# Patient Record
Sex: MALE | Race: OTHER
[De-identification: names, ages, dates, MRNs, and addresses within clinical notes are randomized per-mention and may not be internally consistent; named-entity substitution may affect disease eponyms.]

---

## 2019-04-09 ENCOUNTER — HOSPITAL ENCOUNTER (OUTPATIENT)
Dept: HOSPITAL 12 - LAB | Age: 54
Discharge: HOME | End: 2019-04-09
Attending: INTERNAL MEDICINE
Payer: COMMERCIAL

## 2019-04-09 DIAGNOSIS — Z01.810: Primary | ICD-10-CM

## 2019-04-09 DIAGNOSIS — M46.04: ICD-10-CM
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
## 2019-04-09 DIAGNOSIS — S92.911A: ICD-10-CM

## 2019-04-09 DIAGNOSIS — I45.10: ICD-10-CM
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
## 2019-04-09 DIAGNOSIS — R00.0: ICD-10-CM

## 2019-04-09 DIAGNOSIS — J84.10: ICD-10-CM
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
## 2019-04-09 LAB
ALP SERPL-CCNC: 83 U/L (ref 50–136)
ALT SERPL W/O P-5'-P-CCNC: 33 U/L (ref 16–63)
APPEARANCE UR: CLEAR
AST SERPL-CCNC: 19 U/L (ref 15–37)
BASOPHILS # BLD AUTO: 0.1 K/UL (ref 0–8)
BASOPHILS NFR BLD AUTO: 0.9 % (ref 0–2)
BILIRUB SERPL-MCNC: 0.9 MG/DL (ref 0.2–1)
BILIRUB UR QL STRIP: NEGATIVE
BUN SERPL-MCNC: 14 MG/DL (ref 7–18)
CHLORIDE SERPL-SCNC: 101 MMOL/L (ref 98–107)
CO2 SERPL-SCNC: 24 MMOL/L (ref 21–32)
COLOR UR: YELLOW
CREAT SERPL-MCNC: 1.1 MG/DL (ref 0.6–1.3)
DEPRECATED SQUAMOUS URNS QL MICRO: (no result) /HPF
EOSINOPHIL # BLD AUTO: 0.3 K/UL (ref 0–0.7)
EOSINOPHIL NFR BLD AUTO: 2.8 % (ref 0–7)
GLUCOSE SERPL-MCNC: 140 MG/DL (ref 74–106)
GLUCOSE UR STRIP-MCNC: NEGATIVE MG/DL
HCT VFR BLD AUTO: 44.4 % (ref 36.7–47.1)
HGB BLD-MCNC: 15.5 G/DL (ref 12.5–16.3)
HGB UR QL STRIP: (no result)
KETONES UR STRIP-MCNC: NEGATIVE MG/DL
LEUKOCYTE ESTERASE UR QL STRIP: NEGATIVE
LYMPHOCYTES # BLD AUTO: 1.9 K/UL (ref 20–40)
LYMPHOCYTES NFR BLD AUTO: 19 % (ref 20.5–51.5)
MCH RBC QN AUTO: 28.5 UUG (ref 23.8–33.4)
MCHC RBC AUTO-ENTMCNC: 35 G/DL (ref 32.5–36.3)
MCV RBC AUTO: 81.7 FL (ref 73–96.2)
MONOCYTES # BLD AUTO: 0.8 K/UL (ref 2–10)
MONOCYTES NFR BLD AUTO: 8.3 % (ref 0–11)
NEUTROPHILS # BLD AUTO: 6.8 K/UL (ref 1.8–8.9)
NEUTROPHILS NFR BLD AUTO: 69 % (ref 38.5–71.5)
NITRITE UR QL STRIP: NEGATIVE
PH UR STRIP: 6 [PH] (ref 5–8)
PLATELET # BLD AUTO: 271 K/UL (ref 152–348)
POTASSIUM SERPL-SCNC: 3.2 MMOL/L (ref 3.5–5.1)
RBC # BLD AUTO: 5.43 MIL/UL (ref 4.06–5.63)
RBC #/AREA URNS HPF: (no result) /HPF (ref 0–3)
SP GR UR STRIP: 1.01 (ref 1–1.03)
UROBILINOGEN UR STRIP-MCNC: 0.2 E.U./DL
WBC # BLD AUTO: 9.9 K/UL (ref 3.6–10.2)
WBC #/AREA URNS HPF: (no result) /HPF
WBC #/AREA URNS HPF: (no result) /HPF (ref 0–3)
WS STN SPEC: 7.3 G/DL (ref 6.4–8.2)

## 2019-04-09 PROCEDURE — A4663 DIALYSIS BLOOD PRESSURE CUFF: HCPCS

## 2019-04-12 ENCOUNTER — HOSPITAL ENCOUNTER (OUTPATIENT)
Dept: HOSPITAL 12 - DS | Age: 54
Discharge: HOME | End: 2019-04-12
Attending: ORTHOPAEDIC SURGERY
Payer: COMMERCIAL

## 2019-04-12 DIAGNOSIS — F32.9: ICD-10-CM

## 2019-04-12 DIAGNOSIS — Z87.81: ICD-10-CM

## 2019-04-12 DIAGNOSIS — F41.9: ICD-10-CM

## 2019-04-12 DIAGNOSIS — I45.10: ICD-10-CM

## 2019-04-12 DIAGNOSIS — E66.9: ICD-10-CM

## 2019-04-12 DIAGNOSIS — J45.909: ICD-10-CM

## 2019-04-12 DIAGNOSIS — M20.61: Primary | ICD-10-CM

## 2019-04-12 DIAGNOSIS — Z98.890: ICD-10-CM

## 2019-04-12 DIAGNOSIS — Z79.899: ICD-10-CM

## 2019-04-12 PROCEDURE — 36415 COLL VENOUS BLD VENIPUNCTURE: CPT

## 2019-04-12 PROCEDURE — 28208 REPAIR OF FOOT TENDON: CPT

## 2019-04-12 PROCEDURE — 82962 GLUCOSE BLOOD TEST: CPT

## 2019-04-12 PROCEDURE — 84132 ASSAY OF SERUM POTASSIUM: CPT

## 2019-04-12 PROCEDURE — 28160 PARTIAL REMOVAL OF TOE: CPT

## 2019-04-12 PROCEDURE — C1713 ANCHOR/SCREW BN/BN,TIS/BN: HCPCS

## 2019-04-12 PROCEDURE — A4217 STERILE WATER/SALINE, 500 ML: HCPCS

## 2019-06-28 NOTE — NUR
*-* CASE MANAGEMENT NOTES *-*



: RAMONA BROWER (CONTACT FOR ADD'L DAY AND DISCHARGE PLANNING NEEDS).

P:[phone]

P:[phone]

## 2019-07-08 LAB
ADD MANUAL DIFF: NO
ALBUMIN SERPL-MCNC: 4.4 G/DL (ref 3.4–5)
ALBUMIN/GLOB SERPL: 1.8 {RATIO} (ref 1–2.7)
ALP SERPL-CCNC: 81 U/L (ref 46–116)
ALT SERPL-CCNC: 15 U/L (ref 12–78)
ANION GAP SERPL CALC-SCNC: 9 MMOL/L (ref 5–15)
APTT BLD: 25 SEC (ref 23–33)
AST SERPL-CCNC: 13 U/L (ref 15–37)
BASOPHILS NFR BLD AUTO: 1.2 % (ref 0–2)
BILIRUB SERPL-MCNC: 0.8 MG/DL (ref 0.2–1)
BUN SERPL-MCNC: 16 MG/DL (ref 7–18)
CALCIUM SERPL-MCNC: 9.5 MG/DL (ref 8.5–10.1)
CHLORIDE SERPL-SCNC: 105 MMOL/L (ref 98–107)
CO2 SERPL-SCNC: 28 MMOL/L (ref 21–32)
CREAT SERPL-MCNC: 1.5 MG/DL (ref 0.55–1.3)
EOSINOPHIL NFR BLD AUTO: 4 % (ref 0–3)
ERYTHROCYTE [DISTWIDTH] IN BLOOD BY AUTOMATED COUNT: 13.2 % (ref 11.6–14.8)
GLOBULIN SER-MCNC: 2.5 G/DL
HCT VFR BLD CALC: 42.1 % (ref 42–52)
HGB BLD-MCNC: 13.9 G/DL (ref 14.2–18)
INR PPP: 1 (ref 0.9–1.1)
LYMPHOCYTES NFR BLD AUTO: 28.2 % (ref 20–45)
MCV RBC AUTO: 90 FL (ref 80–99)
MONOCYTES NFR BLD AUTO: 7 % (ref 1–10)
NEUTROPHILS NFR BLD AUTO: 59.5 % (ref 45–75)
PLATELET # BLD: 274 K/UL (ref 150–450)
POTASSIUM SERPL-SCNC: 5 MMOL/L (ref 3.5–5.1)
RBC # BLD AUTO: 4.67 M/UL (ref 4.7–6.1)
SODIUM SERPL-SCNC: 142 MMOL/L (ref 136–145)
WBC # BLD AUTO: 7.3 K/UL (ref 4.8–10.8)

## 2019-07-15 ENCOUNTER — HOSPITAL ENCOUNTER (INPATIENT)
Dept: HOSPITAL 72 - SDSOVERFLO | Age: 54
LOS: 10 days | Discharge: NURSING, REHAB OR LONG-TERM CARE | DRG: 470 | End: 2019-07-25
Payer: COMMERCIAL

## 2019-07-15 VITALS — SYSTOLIC BLOOD PRESSURE: 131 MMHG | DIASTOLIC BLOOD PRESSURE: 94 MMHG

## 2019-07-15 VITALS — SYSTOLIC BLOOD PRESSURE: 132 MMHG | DIASTOLIC BLOOD PRESSURE: 89 MMHG

## 2019-07-15 VITALS — DIASTOLIC BLOOD PRESSURE: 87 MMHG | SYSTOLIC BLOOD PRESSURE: 121 MMHG

## 2019-07-15 VITALS — DIASTOLIC BLOOD PRESSURE: 88 MMHG | SYSTOLIC BLOOD PRESSURE: 120 MMHG

## 2019-07-15 VITALS — DIASTOLIC BLOOD PRESSURE: 80 MMHG | SYSTOLIC BLOOD PRESSURE: 127 MMHG

## 2019-07-15 VITALS — DIASTOLIC BLOOD PRESSURE: 84 MMHG | SYSTOLIC BLOOD PRESSURE: 123 MMHG

## 2019-07-15 VITALS — SYSTOLIC BLOOD PRESSURE: 131 MMHG | DIASTOLIC BLOOD PRESSURE: 85 MMHG

## 2019-07-15 VITALS — SYSTOLIC BLOOD PRESSURE: 120 MMHG | DIASTOLIC BLOOD PRESSURE: 83 MMHG

## 2019-07-15 VITALS — SYSTOLIC BLOOD PRESSURE: 117 MMHG | DIASTOLIC BLOOD PRESSURE: 72 MMHG

## 2019-07-15 VITALS — DIASTOLIC BLOOD PRESSURE: 88 MMHG | SYSTOLIC BLOOD PRESSURE: 130 MMHG

## 2019-07-15 VITALS — SYSTOLIC BLOOD PRESSURE: 122 MMHG | DIASTOLIC BLOOD PRESSURE: 85 MMHG

## 2019-07-15 VITALS — HEIGHT: 65 IN | BODY MASS INDEX: 35.49 KG/M2 | WEIGHT: 213 LBS

## 2019-07-15 VITALS — SYSTOLIC BLOOD PRESSURE: 119 MMHG | DIASTOLIC BLOOD PRESSURE: 86 MMHG

## 2019-07-15 VITALS — DIASTOLIC BLOOD PRESSURE: 91 MMHG | SYSTOLIC BLOOD PRESSURE: 121 MMHG

## 2019-07-15 VITALS — SYSTOLIC BLOOD PRESSURE: 130 MMHG | DIASTOLIC BLOOD PRESSURE: 93 MMHG

## 2019-07-15 VITALS — DIASTOLIC BLOOD PRESSURE: 84 MMHG | SYSTOLIC BLOOD PRESSURE: 132 MMHG

## 2019-07-15 VITALS — DIASTOLIC BLOOD PRESSURE: 79 MMHG | SYSTOLIC BLOOD PRESSURE: 115 MMHG

## 2019-07-15 VITALS — SYSTOLIC BLOOD PRESSURE: 127 MMHG | DIASTOLIC BLOOD PRESSURE: 88 MMHG

## 2019-07-15 VITALS — SYSTOLIC BLOOD PRESSURE: 132 MMHG | DIASTOLIC BLOOD PRESSURE: 86 MMHG

## 2019-07-15 DIAGNOSIS — N28.9: ICD-10-CM

## 2019-07-15 DIAGNOSIS — I10: ICD-10-CM

## 2019-07-15 DIAGNOSIS — M12.561: Primary | ICD-10-CM

## 2019-07-15 DIAGNOSIS — D64.9: ICD-10-CM

## 2019-07-15 PROCEDURE — 85610 PROTHROMBIN TIME: CPT

## 2019-07-15 PROCEDURE — 80048 BASIC METABOLIC PNL TOTAL CA: CPT

## 2019-07-15 PROCEDURE — 86901 BLOOD TYPING SEROLOGIC RH(D): CPT

## 2019-07-15 PROCEDURE — 86709 HEPATITIS A IGM ANTIBODY: CPT

## 2019-07-15 PROCEDURE — 80053 COMPREHEN METABOLIC PANEL: CPT

## 2019-07-15 PROCEDURE — 86803 HEPATITIS C AB TEST: CPT

## 2019-07-15 PROCEDURE — 0SRC0J9 REPLACEMENT OF RIGHT KNEE JOINT WITH SYNTHETIC SUBSTITUTE, CEMENTED, OPEN APPROACH: ICD-10-PCS

## 2019-07-15 PROCEDURE — 86705 HEP B CORE ANTIBODY IGM: CPT

## 2019-07-15 PROCEDURE — 87340 HEPATITIS B SURFACE AG IA: CPT

## 2019-07-15 PROCEDURE — 86850 RBC ANTIBODY SCREEN: CPT

## 2019-07-15 PROCEDURE — 86900 BLOOD TYPING SEROLOGIC ABO: CPT

## 2019-07-15 PROCEDURE — 82728 ASSAY OF FERRITIN: CPT

## 2019-07-15 PROCEDURE — 94003 VENT MGMT INPAT SUBQ DAY: CPT

## 2019-07-15 PROCEDURE — 36415 COLL VENOUS BLD VENIPUNCTURE: CPT

## 2019-07-15 PROCEDURE — 84443 ASSAY THYROID STIM HORMONE: CPT

## 2019-07-15 PROCEDURE — 85730 THROMBOPLASTIN TIME PARTIAL: CPT

## 2019-07-15 PROCEDURE — 83550 IRON BINDING TEST: CPT

## 2019-07-15 PROCEDURE — 87081 CULTURE SCREEN ONLY: CPT

## 2019-07-15 PROCEDURE — 94150 VITAL CAPACITY TEST: CPT

## 2019-07-15 PROCEDURE — 83540 ASSAY OF IRON: CPT

## 2019-07-15 PROCEDURE — 85025 COMPLETE CBC W/AUTO DIFF WBC: CPT

## 2019-07-15 RX ADMIN — SODIUM CHLORIDE SCH MLS/HR: 0.9 INJECTION INTRAVENOUS at 23:46

## 2019-07-15 RX ADMIN — MORPHINE SULFATE PRN MLS/HR: 1 INJECTION, SOLUTION INTRAVENOUS at 19:02

## 2019-07-15 RX ADMIN — DOCUSATE SODIUM SCH MG: 100 CAPSULE, LIQUID FILLED ORAL at 18:01

## 2019-07-15 RX ADMIN — ACETAMINOPHEN SCH MG: 500 TABLET, FILM COATED ORAL at 18:01

## 2019-07-15 RX ADMIN — DEXTROSE, SODIUM CHLORIDE, AND POTASSIUM CHLORIDE SCH MLS/HR: 5; .45; .15 INJECTION INTRAVENOUS at 15:21

## 2019-07-15 RX ADMIN — MORPHINE SULFATE PRN MLS/HR: 1 INJECTION, SOLUTION INTRAVENOUS at 12:08

## 2019-07-15 RX ADMIN — DOCUSATE SODIUM SCH MG: 100 CAPSULE, LIQUID FILLED ORAL at 13:00

## 2019-07-15 RX ADMIN — OXYCODONE HYDROCHLORIDE SCH MG: 20 TABLET, FILM COATED, EXTENDED RELEASE ORAL at 20:30

## 2019-07-15 RX ADMIN — ACETAMINOPHEN SCH MG: 500 TABLET, FILM COATED ORAL at 13:00

## 2019-07-15 RX ADMIN — SODIUM CHLORIDE SCH MLS/HR: 0.9 INJECTION INTRAVENOUS at 15:21

## 2019-07-15 NOTE — NUR
NURSE NOTES:



Pt arrived the unit. Patient a/o x 4, in bed. Denies pain, no respiratory distress noted. 
Left wrist IV is patent. Right knee surgical site dressing is C/D/I. Ice pack applied. 
Hemovac is in placed. Unit orientation was given. Bed in lowest position and locked, call 
light within reach. Will continue to monitor.

## 2019-07-15 NOTE — OPERATIVE NOTE - DICTATED
DATE OF OPERATION:  07/15/2019

SURGEON:  Phil Choi M.D.



ANESTHESIOLOGIST:  Kashif Burroughs M.D.



ANESTHESIA:  General and spinal.



ASSISTANT:  DAREN Langford



PREOPERATIVE DIAGNOSIS:  Tricompartmental traumatic arthritis right knee

with significant varus weightbearing deformity and medial compartment

joint space narrowing.



POSTOPERATIVE DIAGNOSIS:  Tricompartmental traumatic arthritis right knee

with significant varus weightbearing deformity and medial compartment

joint space narrowing.



OPERATIVE PROCEDURE:  Right knee resurfacing arthroplasty using a Billy

Natural Knee with a 0 cemented 7 mm all-polyethylene patella, a size 2

femoral component and a size 1 tibial component with a 9 mm congruent

polyethylene and two 35 mm APR screws.  The femoral and tibial components

were both ingrowth.  The procedure involved the lateral patellar

retinacular release and realignment by ligament rebalancing.  The patient

was prepped and draped supine on the operating table.  After induction of

satisfactory spinal and general anesthesia, the right leg was positioned

with a bolster behind the right hip to neutralize rotation of the knee.  A

bump was placed on the table to facilitate maintenance of the knee in

flexion during procedure and a hip bump was used to prevent external rotation

of the hip also during the procedure.  The leg was examined with 10

degrees lacking in full extension and further flexion to 100 degrees.

There was no ligamentous instability.  No drawer and no Lachman's.  No

rotational instability.  The patient was stable to varus and valgus

stress.  There was notable patellofemoral crepitus with moderate lateral

patellar tracking.  The leg was prepped and draped freely and then

elevated and exsanguinated with an Esmarch bandage.  A medial parapatellar

incision was made along the distal quads expanse going along the medial

border of the patella and inferior patella ligament to the tibial

tuberosity.  Incision was carried down through skin and subcutaneous

tissue.  The extensor apparatus was exposed.  A medial capsular incision

was made down to the tuberosity and through the distal quads tendon.

Lateral retinacular release was accomplished so that the patella could be

turned to 180 degrees exposing its articular surface.  Remnants of the fat

pad was removed and the anterior horn of both the medial and lateral

meniscus was removed.  The superficial medial collateral and posterior

medial capsular ligaments were released to allow bouncing of the knee from

its varus deformity.  Also, the semimembranosus posteriorly was released

from the tibia to facilitate extension.  The patella was exposed.  All the

peripheral articular surfaces were defined by removing adherent

surrounding synovium.  The patella was sized and the minimal articular cut

was made to leave most of the substance of the patella intact while

creating a flat surface for implant of the polyethylene patella.  A

cutting jig was used and then 3 anchoring holes were made for a 0 all-poly

patella.  It was reduced and then checked for range of motion.  The

patella button was cemented in place with all excess cement removed after

implants at the femoral and tibial components.  The knee was then flexed

and a bent knee retractor was used to pull the tibia floor to remove the

remnants of the posterior horn of the medial lateral meniscus and to

recess the PCL over the tibia on its lateral side.  However, the main

attachment of the PCL was left intact.  The femoral medullary canal was

then identified with a jig and an intramedullary anders was inserted.  A

cutting jig was then placed and weightbearing alignment of the hip and

rotation were checked.  The cut that was templated with the jig in place

corresponded to that, which had been measured on the weightbearing x-rays

prior to surgery.  The distal cuff involved a little more medial than

lateral.  The anterior-posterior chamfer and notch cuts were then made.

The trial femoral reduction revealed good contact on all cut surfaces with

good stability and good alignment.  Tibia was then subluxed forward and

the tibial alignment jig was positioned.  It was adjusted for

weightbearing alignment and for slope as well as for varus and valgus.  A

measurement corresponded to the weightbearing x-ray was then chosen and

the tibial cutting block was fixed over 2 drill holes and temporary pins.

The proximal tibial cut was made with the appropriate slope and alignment

taking a little more proximal tibia on the lateral side than medial.  Once

the cut surface was exposed, the trial tibia was placed and anchoring

holes were made for the peripheral support and for the 2 central anchoring

holes.  The central punch was then used to create the cruciate support and

a trial tibial component was then inserted along with a 9 mm polyethylene.

This was templated with the femur in place and provided good stability,

good range of motion, full extension, flexion to 110, good stability

medial and lateral, and good patellar tracking.  The tibial component was

then tapped into place and 2 APR screws were used to secure it.

Polyethylene was then inserted with no gap and the femoral component was

also tapped into place.  Again range of motion and stability were checked.

The tourniquet was then released.  Bleeders were coagulated.  The medial

capsule was closed with sutures of #1 Vicryl to the subcutaneous skin

separately.  The patient was placed in a bulky compression dressing and a

medium Hemovac drain was left in the site of the lateral release, however,

no suction was initially placed .  The patient was returned to recovery

room in good condition.









  ______________________________________________

  Phil Choi M.D.





DR:  JOVANNI

D:  07/15/2019 10:27

T:  07/15/2019 16:05

JOB#:  [id number]

CC:



YAZAN

## 2019-07-15 NOTE — ANETHESIA PREOPERATIVE EVAL
Anesthesia Pre-op PMH/ROS


General


Date of Evaluation:  Jul 15, 2019


Time of Evaluation:  07:20


Anesthesiologist:  Manjeet


ASA Score:  ASA 2


Mallampati Score


Class I : Soft palate, uvula, fauces, pillars visible


Class II: Soft palate, uvula, fauces visible


Class III: Soft palate, base of uvula visible


Class IV: Only hard plate visible


Mallampati Classification:  Class II


Surgeon:  Jimbo


Diagnosis:  R knee DJD


Surgical Procedure:  R total knee arthroplasty


Anesthesia History:  none


Family History:  no anesthesia problems


Allergies:  


Coded Allergies:  


     No Known Allergies (Unverified , 7/12/19)


Medications:  see eMAR


Patient NPO?:  Yes


NPO Date:  Jul 14, 2019


NPO Time:  [id number]





Past Medical History


Cardiovascular:  Reports: HTN - stable on pills


Pulmonary:  Denies: asthma, COPD, KASH, other


Gastrointestinal/Genitourinary:  Reports: GERD, CRI - high Cr follow up 

recomended; 


   Denies: ESRD, other


Neurologic/Psychiatric:  Reports: depression/anxiety, other - chronic pain; 


   Denies: dementia, CVA, TIA


Endocrine:  Denies: DM, hypothyroidism, steroids, other


HEENT:  Denies: cataract (L), cataract (R), glaucoma, [place] (L), [place] (R), other


Hematology/Immune:  Denies: anemia, DVT, bleeding disorder, other


Musculoskeletal/Integumentary:  Reports: DJD; 


   Denies: OA, RA, DDD, edema, other


Other:  other - overweight


PMH Narrative:


as above


PSxH Narrative:


orchidectomy for testicular torsion, hernia repair, knee scope lumbar spine Sx.





Anesthesia Pre-op Phys. Exam


Physician Exam





Last Vital Signs








  Date Time  Temp Pulse Resp B/P (MAP) Pulse Ox O2 Delivery O2 Flow Rate FiO2


 


7/15/19 07:00 98.1 108 20 115/79 (91) 100   


 


7/15/19 06:58      Room Air  








Constitutional:  NAD


Neurologic:  CN 2-12 intact


Cardiovascular:  RRR, no M/R/G


Respiratory:  CTA


Gastrointestinal:  S/NT/ND





Airway Exam


Mallampati Score:  Class II


MO:  limited


Neck:  short


ROM:  limited


Teeth:  missing


Dentures:  no upper, no lower





Anesthesia Pre-op A/P


Labs


see chart





Studies


Pre-op Studies:  EKG - NSR, CXR - WNL, echo - EF 55-60%





Risk Assessment & Plan


Assessment:


ASA 2


Plan:


SAB vs GA


Status Change Before Surgery:  No





Pre-Antibiotics


Drug:  Ancef 2gr.


Given Within 1 Hr of Incision:  Yes


Time Given:  08:20











Khris Burroughs MD Jul 15, 2019 08:45

## 2019-07-15 NOTE — NUR
CASE MANAGEMENT: INITIAL REVIEW 



52 YO M PRESENTED TO HOSPITAL FOR SURGERY 



PMHx: HTN. ASTHMA. HERNIA. 



SI:PAIN RIGHT KNEE

T 98.1  RR 20 B/P 115/79 SATS 100% ON RA 

CR 1.5  AST 13 



IS: OR MEDS 



**PATIENT ADMITTED TO MED/SURG 7/15/2019 @ 1046****



PLAN OF CARE: 

Operative Note

Pre-op Diagnosis:

Degenerative Joint Disease, Right Knee

Procedure:

Right Total Knee Resurface Right

Post-op Diagnosis:  same as pre-op

## 2019-07-15 NOTE — NUR
NURSE NOTES:



Dr. Jarrett was notified regarding home meds. Dr. Jarrett wants to continue Norco 5-325 q4 
prn, Lisinopril 10mg daily, Omeprazole 40mg daily. Noted and carried out.

## 2019-07-15 NOTE — OPERATIVE NOTE - PDOC
Operative Note


Operative Note


Pre-op Diagnosis:


Degenerative Joint Disease, Right Knee


Procedure:


Right Total Knee Resurface Right


Post-op Diagnosis:  same as pre-op


Operative Findings:  consistent w/pre-op dx studies


Surgeon:  Jimbo


Assistant:  CLIFTON Ashton


Anesthesiologist:  Manjeet


Anesthesia:  general


Specimen:  yes


Complications:  none


Condition:  stable


Estimated Blood Loss:  minimal


Drains:  hemovac


Tourniquet time:  110 - min


Implant(s) used?:  Yes - Billy Natural Knee











Phil Ashton Jul 15, 2019 11:00

## 2019-07-15 NOTE — NUR
NURSE NOTES:

Pt is asleep but easily aroused, alert. Breathing is even and non labored. BS sound intact 4 
quadrants. Right knee pain persist but decrease after taking po medication a little bit. 
After administration of PRN Zofran 4mg IVP d/t nausea sense, pt feels better. Explain about 
S/E of PCA medication. Pt verbalizes understanding. On dominguez catheterization 16 fr. d/t op 
and patent with yellowish urine. Will continue to monitor.

## 2019-07-15 NOTE — IMMEDIATE POST-OP EVALUATION
Immediate Post-Op Evalulation


Immediate Post-Op Evalulation


Procedure:  R total knee arthroplasty


Date of Evaluation:  Jul 15, 2019


Time of Evaluation:  11:00


IV Fluids:  1500


Blood Products:  none


Estimated Blood Loss:  150


Urinary Output:  100


Blood Pressure Systolic:  113


Blood Pressure Diastolic:  81


Pulse Rate:  102


Respiratory Rate:  22


O2 Sat by Pulse Oximetry:  99


Temperature (Fahrenheit):  98.2


Pain Score (1-10):  2


Nausea:  No


Vomiting:  No


Complications


none


Patient Status:  reacts, patent, none


Hydration Status:  adequate











Khris Burroughs MD Jul 15, 2019 11:01

## 2019-07-15 NOTE — PRE-PROCEDURE NOTE/ATTESTATION
Pre-Procedure Note/Attestation


Complete Prior to Procedure


Planned Procedure:  right


Procedure Narrative:


For Right Total Knee Arthroplasty





Indications for Procedure


Pre-Operative Diagnosis:


Degenerative Joint Disease, Right Knee





Attestation


I attest that I discussed the nature of the procedure; its benefits; risks and 

complications; and alternatives (and the risks and benefits of such alternatives

), prior to the procedure, with the patient (or the patient's legal 

representative).





I attest that, if there was a reasonable possibility of needing a blood 

transfusion, the patient (or the patient's legal representative) was given the 

[facility] standardized written summary, pursuant 

to the [place] Blood Safety Act ([place] Health and Safety Code # 1645, as 

amended).





I attest that I re-evaluated the patient just prior to the surgery and that 

there has been no change in the patient's H&P, except as documented below:











Phil Ashton Jul 15, 2019 07:11

## 2019-07-15 NOTE — NUR
NURSE NOTES:

Receive a report from SONAL Hurtado. Done rounds. Pt is asleep but easily awake by calling his 
name. On PCA, Morphine, for pain control. Right knee op site is wrapped with ACE without 
bleeding signs. Leave call light within reach. Will continue to monitor.

## 2019-07-15 NOTE — DIAGNOSTIC IMAGING REPORT
Indications:  Postoperative

 

Technique: Two views of the knee

 

Comparison: None

 

Findings: Two postoperative views of the right knee demonstrate total knee

arthroplasty, good anatomic alignment of the prosthesis. There is a surgical drain in

place. . There is postsurgical soft tissue air. Overlying skin staples.

 

Impression: Postoperative right knee, no unusual features.

## 2019-07-16 VITALS — SYSTOLIC BLOOD PRESSURE: 111 MMHG | DIASTOLIC BLOOD PRESSURE: 73 MMHG

## 2019-07-16 VITALS — DIASTOLIC BLOOD PRESSURE: 73 MMHG | SYSTOLIC BLOOD PRESSURE: 112 MMHG

## 2019-07-16 VITALS — SYSTOLIC BLOOD PRESSURE: 95 MMHG | DIASTOLIC BLOOD PRESSURE: 60 MMHG

## 2019-07-16 VITALS — SYSTOLIC BLOOD PRESSURE: 87 MMHG | DIASTOLIC BLOOD PRESSURE: 55 MMHG

## 2019-07-16 VITALS — DIASTOLIC BLOOD PRESSURE: 73 MMHG | SYSTOLIC BLOOD PRESSURE: 116 MMHG

## 2019-07-16 VITALS — DIASTOLIC BLOOD PRESSURE: 52 MMHG | SYSTOLIC BLOOD PRESSURE: 89 MMHG

## 2019-07-16 LAB
ADD MANUAL DIFF: NO
BASOPHILS NFR BLD AUTO: 0.5 % (ref 0–2)
EOSINOPHIL NFR BLD AUTO: 2 % (ref 0–3)
ERYTHROCYTE [DISTWIDTH] IN BLOOD BY AUTOMATED COUNT: 13 % (ref 11.6–14.8)
HCT VFR BLD CALC: 29.6 % (ref 42–52)
HGB BLD-MCNC: 10 G/DL (ref 14.2–18)
LYMPHOCYTES NFR BLD AUTO: 12.6 % (ref 20–45)
MCV RBC AUTO: 91 FL (ref 80–99)
MONOCYTES NFR BLD AUTO: 11 % (ref 1–10)
NEUTROPHILS NFR BLD AUTO: 74 % (ref 45–75)
PLATELET # BLD: 173 K/UL (ref 150–450)
RBC # BLD AUTO: 3.25 M/UL (ref 4.7–6.1)
WBC # BLD AUTO: 11 K/UL (ref 4.8–10.8)

## 2019-07-16 RX ADMIN — MORPHINE SULFATE PRN MLS/HR: 1 INJECTION, SOLUTION INTRAVENOUS at 19:44

## 2019-07-16 RX ADMIN — ENOXAPARIN SODIUM SCH MG: 40 INJECTION SUBCUTANEOUS at 09:20

## 2019-07-16 RX ADMIN — SODIUM CHLORIDE SCH MLS/HR: 0.9 INJECTION INTRAVENOUS at 23:48

## 2019-07-16 RX ADMIN — ACETAMINOPHEN SCH MG: 500 TABLET, FILM COATED ORAL at 13:04

## 2019-07-16 RX ADMIN — SODIUM CHLORIDE SCH MLS/HR: 0.9 INJECTION INTRAVENOUS at 08:19

## 2019-07-16 RX ADMIN — ACETAMINOPHEN SCH MG: 500 TABLET, FILM COATED ORAL at 08:20

## 2019-07-16 RX ADMIN — LISINOPRIL SCH MG: 10 TABLET ORAL at 08:19

## 2019-07-16 RX ADMIN — OXYCODONE HYDROCHLORIDE SCH MG: 20 TABLET, FILM COATED, EXTENDED RELEASE ORAL at 08:19

## 2019-07-16 RX ADMIN — ACETAMINOPHEN SCH MG: 500 TABLET, FILM COATED ORAL at 18:10

## 2019-07-16 RX ADMIN — SODIUM CHLORIDE SCH MLS/HR: 0.9 INJECTION INTRAVENOUS at 16:27

## 2019-07-16 RX ADMIN — DOCUSATE SODIUM SCH MG: 100 CAPSULE, LIQUID FILLED ORAL at 13:03

## 2019-07-16 RX ADMIN — DEXTROSE, SODIUM CHLORIDE, AND POTASSIUM CHLORIDE SCH MLS/HR: 5; .45; .15 INJECTION INTRAVENOUS at 04:48

## 2019-07-16 RX ADMIN — OXYCODONE HYDROCHLORIDE SCH MG: 20 TABLET, FILM COATED, EXTENDED RELEASE ORAL at 20:58

## 2019-07-16 RX ADMIN — DOCUSATE SODIUM SCH MG: 100 CAPSULE, LIQUID FILLED ORAL at 08:20

## 2019-07-16 RX ADMIN — DOCUSATE SODIUM SCH MG: 100 CAPSULE, LIQUID FILLED ORAL at 18:11

## 2019-07-16 NOTE — NUR
NURSE NOTES:

pt is asleep but easily awake, alert. Spo2 checked as 92% in RA. Lung sound is clear but 
decreased on both lower lung. Encourage to deep breathing and apply O2 2L/min via NC. Spo2 
goes up to 95-96%. Keep Semi-Brasher's position. BP: 95.60mmHg, OR: 109 bmp. No HA or 
dizziness noted. Op site is clear without bleeding signs. Hemovac inserted state and 
serosanguineous drainage drained. Will continue to monitor.

## 2019-07-16 NOTE — NUR
*** INSURANCE ***



UPDATED CLINICALS and REVIEW HAVE BEEN FAXED 

PLEASE FAX THE REVIEW AND CLINICAL TO



CALLED : RAMONA BROWER

P:[phone]

P:[phone]

F: [phone]

## 2019-07-16 NOTE — GENERAL PROGRESS NOTE
Assessment/Plan


Assessment/Plan:


Right knee resurfacing arthroplasty


right knee arthritis


postop pain 





PLAN


1. incentive spirometry


2. Lovenox


3. PT evaluation and therapy


4. Hydration


5. Pain management


6. discharge once stable with outpatient follow up





Subjective


Allergies:  


Coded Allergies:  


     No Known Allergies (Unverified , 7/12/19)


Subjective


care noted


asked to follow up post op





Objective





Last 24 Hour Vital Signs








  Date Time  Temp Pulse Resp B/P (MAP) Pulse Ox O2 Delivery O2 Flow Rate FiO2


 


7/16/19 08:19    112/73    


 


7/16/19 04:00 97.8 103 19 116/73 (87) 97   


 


7/16/19 04:00   18     


 


7/16/19 00:00   18     


 


7/16/19 00:00 98.6 109 18 95/60 (72) 95   


 


7/15/19 21:00      Room Air  


 


7/15/19 20:00 97.6 93 18 127/88 (101) 96   


 


7/15/19 20:00   18     


 


7/15/19 16:35 98.2 79 20 117/72 (87) 97   


 


7/15/19 16:00   18     


 


7/15/19 15:35 97.1 93 19 130/93 (105) 97   


 


7/15/19 14:35 98.7 94 18 131/94 (106) 100   


 


7/15/19 13:35 97.2 95 21 132/89 (103) 96   


 


7/15/19 13:05 97.6 99 18 127/80 (96) 100   


 


7/15/19 13:00 98.6 95 19 131/85 100 Nasal Cannula 2.0 


 


7/15/19 12:58   18     


 


7/15/19 12:45  96 20 132/86 98 Nasal Cannula 2.0 


 


7/15/19 12:38 98.6       


 


7/15/19 12:30  98 19 123/84 98 Nasal Cannula 2.0 


 


7/15/19 12:15  99 20 122/85 97 Nasal Cannula 2.0 


 


7/15/19 12:00  97 19 119/86 97 Nasal Cannula 2.0 


 


7/15/19 11:45  98 20 121/87 98 Nasal Cannula 2.0 


 


7/15/19 11:30  99 18 132/84 100 Simple Mask 6.0 


 


7/15/19 11:15  100 19 121/91 100 Simple Mask 6.0 


 


7/15/19 11:06  102 20 130/88 100 Simple Mask 6.0 


 


7/15/19 11:01  103 20 120/88 100 Simple Mask 6.0 


 


7/15/19 11:01  102 22  99   


 


7/15/19 10:56 98.7 104 22 120/83 100 Simple Mask 6.0 


 


7/15/19 09:00 98.6       

















Intake and Output  


 


 7/15/19 7/16/19





 19:00 07:00


 


Intake Total 1638 ml 1075 ml


 


Output Total 690 ml 815 ml


 


Balance 948 ml 260 ml


 


  


 


Intake Oral 238 ml 200 ml


 


IV Total 1400 ml 875 ml


 


Output Urine Total 430 ml 625 ml


 


Drainage Total 260 ml 190 ml


 


# Voids  1








Laboratory Tests


7/16/19 05:20: 


White Blood Count 11.0H, Red Blood Count 3.25L, Hemoglobin 10.0L, Hematocrit 

29.6L, Mean Corpuscular Volume 91, Mean Corpuscular Hemoglobin 31.0, Mean 

Corpuscular Hemoglobin Concent 33.9, Red Cell Distribution Width 13.0, Platelet 

Count 173, Mean Platelet Volume 6.3L, Neutrophils (%) (Auto) 74.0, Lymphocytes (

%) (Auto) 12.6L, Monocytes (%) (Auto) 11.0H, Eosinophils (%) (Auto) 2.0, 

Basophils (%) (Auto) 0.5


Height (Feet):  5


Height (Inches):  5.00


Weight (Pounds):  192


Objective


WDWN


NAD


clear breath sounds bilaterally without rhonchi or wheeze


[id number] without MRG


NABS nontender no HSM


no CCE


nonfocal











Suraj Jarrett MD Jul 16, 2019 08:46

## 2019-07-16 NOTE — NUR
PT EVALUATION NOTE

Patient seen for initial evaluation, see complete evaluation for details. Patient presents 
with limited 

functional mobility and pain s/p R TKA. Patient will benefit from skilled inpatient PT 
intervention to address strength, ROM, safety and functional mobility with appropriate 
assistive device. Recommend discharge to SNF for further rehab vs home with home PT 
depending on patient's progress once 

medically cleared by MD. Patient lives in second floor apartment, has 12-14 stairs to 
negotiate to access apartment. Patient has FWW at home, may also benefit from raised toilet 
seat. 

-------------------------------------------------------------------------------

Addendum: 07/16/19 at 0955 by NIKOS HAN PT

-------------------------------------------------------------------------------

Amended: Links added.

## 2019-07-16 NOTE — NUR
NURSE NOTES:

Patient is in bed awake and able to verbalize needs. Stable. COmplains of pain, patient 
encouraged to use PCA as ordered. Patient is encouraged to use call light for assistance, 
verbalized understanding. Patient is in bed in locked and lowest position with call light 
within reach. Will continue to monitor.

## 2019-07-16 NOTE — 48 HOUR POST ANESTHESIA EVAL
Post Anesthesia Evaluation


Procedure:  R total knee arthroplasty


Date of Evaluation:  Jul 16, 2019


Time of Evaluation:  09:02


Blood Pressure Systolic:  125


0:  76


Pulse Rate:  72


Respiratory Rate:  22


Temperature (Fahrenheit):  97.7


O2 Sat by Pulse Oximetry:  98


Airway:  patent


Nausea:  No


Vomiting:  No


Pain Intensity:  3


Hydration Status:  adequate


Cardiopulmonary Status:


stable


Mental Status/LOC:  patient returned to baseline


Follow-up Care/Observations:


n/a


Post-Anesthesia Complications:


none


Follow-up care needed:  N/A











Khris Burroughs MD Jul 16, 2019 09:04

## 2019-07-16 NOTE — NUR
***DISCHARGE PLANNING

CALLED : RAMONA BROWER

P:[phone]

P:[phone]

REQUESTED ARU PLACEMENT

[facility]FA FORM FAXED TO [facility] FROM DR BOOGIE'S OFFICE

WILL PROBABLY TAKE A DAY OR TWO FOR APPROVAL



*WHEN [facility] GOES OUT OF TOWN PLEASE CONTACT [facility] T: [phone]

## 2019-07-16 NOTE — NUR
CASE MANAGEMENT:REVIEW



7/16/19

SI: POD #1

RT KNEE RESURFACING ARTHROPLASTY

99.8   126  18  112/73  98% ON RA

WBC+11.0   H/H-10.0/29.6   



IS: IV ANCEF Q8HRS

LISINOPRIL PO QD

IV ANCEF Q8HR

MORPHINE PCA

**: MED/SURG STATUS 3 EAST



PLAN:

REFER TO ACUTE REHAB FOR RECOVERY

## 2019-07-16 NOTE — NUR
NURSE NOTES:

Endorsed to night shift nurse that patient has not yet voided following dominguez removal.

## 2019-07-16 NOTE — NUR
NURSE NOTES:



Received patient awake,alert, verbal,resting in bed, wound dressing dry and intact with ace 
wrap on.

## 2019-07-16 NOTE — NUR
NURSE NOTES:

Nausea sensation gets better after prn Zofran 4mg IVP. Pain continue but intensity gets 
decreased than yesterday with PCA, Morphine. Op site is clear without bleeding sign. Empty 
Hemovac total 190ml, bloody color drainage. Spo2 checked 97 % with O2 2L/min NC. Will 
continue to monitor.

## 2019-07-17 VITALS — SYSTOLIC BLOOD PRESSURE: 99 MMHG | DIASTOLIC BLOOD PRESSURE: 55 MMHG

## 2019-07-17 VITALS — DIASTOLIC BLOOD PRESSURE: 48 MMHG | SYSTOLIC BLOOD PRESSURE: 77 MMHG

## 2019-07-17 VITALS — DIASTOLIC BLOOD PRESSURE: 69 MMHG | SYSTOLIC BLOOD PRESSURE: 104 MMHG

## 2019-07-17 VITALS — SYSTOLIC BLOOD PRESSURE: 89 MMHG | DIASTOLIC BLOOD PRESSURE: 48 MMHG

## 2019-07-17 VITALS — SYSTOLIC BLOOD PRESSURE: 96 MMHG | DIASTOLIC BLOOD PRESSURE: 61 MMHG

## 2019-07-17 LAB
ADD MANUAL DIFF: NO
BASOPHILS NFR BLD AUTO: 0.4 % (ref 0–2)
EOSINOPHIL NFR BLD AUTO: 1.7 % (ref 0–3)
ERYTHROCYTE [DISTWIDTH] IN BLOOD BY AUTOMATED COUNT: 13.2 % (ref 11.6–14.8)
HCT VFR BLD CALC: 25.2 % (ref 42–52)
HGB BLD-MCNC: 8.7 G/DL (ref 14.2–18)
LYMPHOCYTES NFR BLD AUTO: 9.5 % (ref 20–45)
MCV RBC AUTO: 91 FL (ref 80–99)
MONOCYTES NFR BLD AUTO: 8.9 % (ref 1–10)
NEUTROPHILS NFR BLD AUTO: 79.5 % (ref 45–75)
PLATELET # BLD: 173 K/UL (ref 150–450)
RBC # BLD AUTO: 2.77 M/UL (ref 4.7–6.1)
WBC # BLD AUTO: 15.1 K/UL (ref 4.8–10.8)

## 2019-07-17 RX ADMIN — DOCUSATE SODIUM SCH MG: 100 CAPSULE, LIQUID FILLED ORAL at 13:10

## 2019-07-17 RX ADMIN — DOCUSATE SODIUM SCH MG: 100 CAPSULE, LIQUID FILLED ORAL at 17:58

## 2019-07-17 RX ADMIN — DOCUSATE SODIUM SCH MG: 100 CAPSULE, LIQUID FILLED ORAL at 08:25

## 2019-07-17 RX ADMIN — OXYCODONE HYDROCHLORIDE SCH MG: 20 TABLET, FILM COATED, EXTENDED RELEASE ORAL at 21:10

## 2019-07-17 RX ADMIN — ACETAMINOPHEN SCH MG: 500 TABLET, FILM COATED ORAL at 13:10

## 2019-07-17 RX ADMIN — LISINOPRIL SCH MG: 10 TABLET ORAL at 08:57

## 2019-07-17 RX ADMIN — SODIUM CHLORIDE SCH MLS/HR: 0.9 INJECTION INTRAVENOUS at 08:21

## 2019-07-17 RX ADMIN — ACETAMINOPHEN SCH MG: 500 TABLET, FILM COATED ORAL at 08:24

## 2019-07-17 RX ADMIN — OXYCODONE HYDROCHLORIDE SCH MG: 20 TABLET, FILM COATED, EXTENDED RELEASE ORAL at 08:24

## 2019-07-17 RX ADMIN — ENOXAPARIN SODIUM SCH MG: 40 INJECTION SUBCUTANEOUS at 08:23

## 2019-07-17 RX ADMIN — ACETAMINOPHEN SCH MG: 500 TABLET, FILM COATED ORAL at 17:58

## 2019-07-17 RX ADMIN — SODIUM CHLORIDE SCH MLS/HR: 0.9 INJECTION INTRAVENOUS at 23:44

## 2019-07-17 RX ADMIN — SODIUM CHLORIDE SCH MLS/HR: 0.9 INJECTION INTRAVENOUS at 16:16

## 2019-07-17 NOTE — NUR
CASE MANAGEMENT:REVIEW



7/17/19

SI: POD #2

RT KNEE RESURFACING ARTHROPLASTY

99.1   100  18  89/48   92% ON RA

WBC+15.1   H/H-8.7/25.2



IS: IV ANCEF Q8HRS

LISINOPRIL PO QD

IV ANCEF Q8HR

MORPHINE PCA

LOVENOX

**: MED/SURG STATUS 3 EAST



PLAN:

REFER TO ACUTE REHAB FOR RECOVERY

## 2019-07-17 NOTE — GENERAL SURGERY PROGRESS NOTE
General Surgery-Progress Note


Subjective


Procedure Performed


Right Total Knee Resurface Right


Symptoms:  improved





Objective





Last 24 Hour Vital Signs








  Date Time  Temp Pulse Resp B/P (MAP) Pulse Ox O2 Delivery O2 Flow Rate FiO2


 


7/17/19 12:00   18     


 


7/17/19 12:00 99.9 113 16 104/69 (81) 93   


 


7/17/19 09:00      Room Air  


 


7/17/19 08:00 99.9 119 18 77/48 (58) 94   


 


7/17/19 08:00   18     


 


7/17/19 04:00 99.1 100 18 89/48 (62) 92   


 


7/17/19 04:00   18     


 


7/17/19 00:00   16     


 


7/16/19 21:28 98.6       


 


7/16/19 21:20      Room Air  


 


7/16/19 20:14 98.6       


 


7/16/19 20:00 98.6 105 18 87/55 (66) 95   


 


7/16/19 19:51   18     


 


7/16/19 16:14 98.6 105 18 89/52 (64) 97   


 


7/16/19 16:00   18     








I&O











Intake and Output  


 


 7/16/19 7/17/19





 19:00 07:00


 


Intake Total 620 ml 455 ml


 


Output Total 770 ml 25 ml


 


Balance -150 ml 430 ml


 


  


 


Intake Oral 620 ml 400 ml


 


IV Total  55 ml


 


Output Urine Total 700 ml 


 


Drainage Total 70 ml 25 ml


 


# Voids  4








Dressing:  dry


Wound:  clean


Drains:  hemovac





Laboratory Tests








Test


  7/17/19


05:41


 


White Blood Count


  15.1 K/UL


(4.8-10.8)  H


 


Red Blood Count


  2.77 M/UL


(4.70-6.10)  L


 


Hemoglobin


  8.7 G/DL


(14.2-18.0)  L


 


Hematocrit


  25.2 %


(42.0-52.0)  L


 


Mean Corpuscular Volume 91 FL (80-99)  


 


Mean Corpuscular Hemoglobin


  31.3 PG


(27.0-31.0)  H


 


Mean Corpuscular Hemoglobin


Concent 34.4 G/DL


(32.0-36.0)


 


Red Cell Distribution Width


  13.2 %


(11.6-14.8)


 


Platelet Count


  173 K/UL


(150-450)


 


Mean Platelet Volume


  6.8 FL


(6.5-10.1)


 


Neutrophils (%) (Auto)


  79.5 %


(45.0-75.0)  H


 


Lymphocytes (%) (Auto)


  9.5 %


(20.0-45.0)  L


 


Monocytes (%) (Auto)


  8.9 %


(1.0-10.0)


 


Eosinophils (%) (Auto)


  1.7 %


(0.0-3.0)


 


Basophils (%) (Auto)


  0.4 %


(0.0-2.0)








Imaging


X-ray Post Op Right knee: good position of implants


Additional Comments


wound clean and dry drain output decreasing





Assessment


Additional Comments


PT/OT working with patient.


Dr. Jarrett following.


Patient has NO stable living arrangements and will need Rehab and possible 

temporary housing to [facility] to. Discharge Planner and our office working on this.











Phil Ashton Jul 17, 2019 14:58

## 2019-07-17 NOTE — GENERAL PROGRESS NOTE
Assessment/Plan


Assessment/Plan:


Right knee resurfacing arthroplasty


right knee arthritis


postop pain 





PLAN


1. incentive spirometry


2. Lovenox


3. PT evaluation and therapy


4. Hydration; bolus; hold antihypertensives


5. Pain management


6. discharge once stable with outpatient follow up





Subjective


Allergies:  


Coded Allergies:  


     No Known Allergies (Unverified , 7/12/19)


Subjective


care noted


hypotensive this am





Objective





Last 24 Hour Vital Signs








  Date Time  Temp Pulse Resp B/P (MAP) Pulse Ox O2 Delivery O2 Flow Rate FiO2


 


7/17/19 04:00 99.1 100 18 89/48 (62) 92   


 


7/17/19 04:00   18     


 


7/17/19 00:00   16     


 


7/16/19 21:28 98.6       


 


7/16/19 21:20      Room Air  


 


7/16/19 20:14 98.6       


 


7/16/19 20:00 98.6 105 18 87/55 (66) 95   


 


7/16/19 19:51   18     


 


7/16/19 16:14 98.6 105 18 89/52 (64) 97   


 


7/16/19 16:00   18     


 


7/16/19 12:00   18     


 


7/16/19 11:50 98.5 100 18 111/73 (86) 96   

















Intake and Output  


 


 7/16/19 7/17/19





 19:00 07:00


 


Intake Total 620 ml 455 ml


 


Output Total 770 ml 25 ml


 


Balance -150 ml 430 ml


 


  


 


Intake Oral 620 ml 400 ml


 


IV Total  55 ml


 


Output Urine Total 700 ml 


 


Drainage Total 70 ml 25 ml


 


# Voids  4








Laboratory Tests


7/17/19 05:41: 


White Blood Count 15.1H, Red Blood Count 2.77L, Hemoglobin 8.7L, Hematocrit 

25.2L, Mean Corpuscular Volume 91, Mean Corpuscular Hemoglobin 31.3H, Mean 

Corpuscular Hemoglobin Concent 34.4, Red Cell Distribution Width 13.2, Platelet 

Count 173, Mean Platelet Volume 6.8, Neutrophils (%) (Auto) 79.5H, Lymphocytes (

%) (Auto) 9.5L, Monocytes (%) (Auto) 8.9, Eosinophils (%) (Auto) 1.7, Basophils 

(%) (Auto) 0.4


Height (Feet):  5


Height (Inches):  5.00


Weight (Pounds):  220


Objective


WDWN


NAD


clear breath sounds bilaterally without rhonchi or wheeze


[id number] without MRG


NABS nontender no HSM


no CCE


nonfocal











Suraj Jarrett MD Jul 17, 2019 10:20

## 2019-07-17 NOTE — NUR
NURSE NOTES:

Patient no longer on PCA. Remaining medication and tubing returned to pharmacist Ladarius.

## 2019-07-17 NOTE — NUR
NURSE NOTES:

Received report from SONAL Zabala and rounds made with out going nurse. Received pt lying in 
bed, AOX4, pain level 7/10, pain medication not yet due. Surgical dressing C/D/I. Hemovac 
inplace with serosangenous drainage. Applied ice pack to surgical site. IV L wrist patent 
and intact. Pt refused SCD. Bed in lowest position and locked, side rails up x 2, call light 
within reach. Will continue to monitor.

## 2019-07-17 NOTE — NUR
NURSE NOTES:

Patient's bp is 77/48, pulse 119. Dr. Jarrett aware. 1L NS bolus running as ordered. Patient 
is stable.

## 2019-07-17 NOTE — NUR
NURSE NOTES:

Patient is in bed awake and able to verbalize needs. Stable. Denies severe pain, patient 
verbalized that he will use PCA as needed. Patient encouraged to use call light for 
assistance, verbalized understanding. Patient is in bed in locked and lowest position with 
call light within reach. Will continue to monitor.

## 2019-07-18 VITALS — SYSTOLIC BLOOD PRESSURE: 88 MMHG | DIASTOLIC BLOOD PRESSURE: 59 MMHG

## 2019-07-18 VITALS — SYSTOLIC BLOOD PRESSURE: 89 MMHG | DIASTOLIC BLOOD PRESSURE: 55 MMHG

## 2019-07-18 VITALS — SYSTOLIC BLOOD PRESSURE: 110 MMHG | DIASTOLIC BLOOD PRESSURE: 71 MMHG

## 2019-07-18 VITALS — DIASTOLIC BLOOD PRESSURE: 64 MMHG | SYSTOLIC BLOOD PRESSURE: 103 MMHG

## 2019-07-18 VITALS — SYSTOLIC BLOOD PRESSURE: 91 MMHG | DIASTOLIC BLOOD PRESSURE: 60 MMHG

## 2019-07-18 VITALS — DIASTOLIC BLOOD PRESSURE: 54 MMHG | SYSTOLIC BLOOD PRESSURE: 88 MMHG

## 2019-07-18 VITALS — SYSTOLIC BLOOD PRESSURE: 99 MMHG | DIASTOLIC BLOOD PRESSURE: 66 MMHG

## 2019-07-18 LAB
% IRON SATURATION: 9 % (ref 15–50)
ADD MANUAL DIFF: NO
ANION GAP SERPL CALC-SCNC: 8 MMOL/L (ref 5–15)
BASOPHILS NFR BLD AUTO: 0.5 % (ref 0–2)
BUN SERPL-MCNC: 19 MG/DL (ref 7–18)
CALCIUM SERPL-MCNC: 8.2 MG/DL (ref 8.5–10.1)
CHLORIDE SERPL-SCNC: 106 MMOL/L (ref 98–107)
CO2 SERPL-SCNC: 25 MMOL/L (ref 21–32)
CREAT SERPL-MCNC: 1.5 MG/DL (ref 0.55–1.3)
EOSINOPHIL NFR BLD AUTO: 2.3 % (ref 0–3)
ERYTHROCYTE [DISTWIDTH] IN BLOOD BY AUTOMATED COUNT: 12.9 % (ref 11.6–14.8)
FERRITIN SERPL-MCNC: 188 NG/ML (ref 8–388)
HCT VFR BLD CALC: 23.8 % (ref 42–52)
HGB BLD-MCNC: 8.1 G/DL (ref 14.2–18)
IRON SERPL-MCNC: 19 UG/DL (ref 50–175)
LYMPHOCYTES NFR BLD AUTO: 11.1 % (ref 20–45)
MCV RBC AUTO: 91 FL (ref 80–99)
MONOCYTES NFR BLD AUTO: 9.7 % (ref 1–10)
NEUTROPHILS NFR BLD AUTO: 76.5 % (ref 45–75)
PLATELET # BLD: 184 K/UL (ref 150–450)
POTASSIUM SERPL-SCNC: 4.3 MMOL/L (ref 3.5–5.1)
RBC # BLD AUTO: 2.62 M/UL (ref 4.7–6.1)
SODIUM SERPL-SCNC: 139 MMOL/L (ref 136–145)
TIBC SERPL-MCNC: 203 UG/DL (ref 250–450)
UNSATURATED IRON BINDING: 184 UG/DL (ref 112–346)
WBC # BLD AUTO: 12.6 K/UL (ref 4.8–10.8)

## 2019-07-18 RX ADMIN — LISINOPRIL SCH MG: 10 TABLET ORAL at 08:47

## 2019-07-18 RX ADMIN — ACETAMINOPHEN SCH MG: 500 TABLET, FILM COATED ORAL at 08:46

## 2019-07-18 RX ADMIN — DOCUSATE SODIUM SCH MG: 100 CAPSULE, LIQUID FILLED ORAL at 18:52

## 2019-07-18 RX ADMIN — OXYCODONE HYDROCHLORIDE SCH MG: 20 TABLET, FILM COATED, EXTENDED RELEASE ORAL at 08:47

## 2019-07-18 RX ADMIN — ENOXAPARIN SODIUM SCH MG: 40 INJECTION SUBCUTANEOUS at 08:43

## 2019-07-18 RX ADMIN — DOCUSATE SODIUM SCH MG: 100 CAPSULE, LIQUID FILLED ORAL at 12:45

## 2019-07-18 RX ADMIN — DOCUSATE SODIUM SCH MG: 100 CAPSULE, LIQUID FILLED ORAL at 08:42

## 2019-07-18 RX ADMIN — SODIUM CHLORIDE SCH MLS/HR: 0.9 INJECTION INTRAVENOUS at 08:39

## 2019-07-18 RX ADMIN — SODIUM CHLORIDE SCH MLS/HR: 0.9 INJECTION INTRAVENOUS at 21:09

## 2019-07-18 RX ADMIN — ACETAMINOPHEN SCH MG: 500 TABLET, FILM COATED ORAL at 13:10

## 2019-07-18 RX ADMIN — OXYCODONE HYDROCHLORIDE SCH MG: 20 TABLET, FILM COATED, EXTENDED RELEASE ORAL at 21:17

## 2019-07-18 RX ADMIN — ACETAMINOPHEN SCH MG: 500 TABLET, FILM COATED ORAL at 18:52

## 2019-07-18 NOTE — NUR
NURSE NOTES:

Dr. Adhikari notified of low blood pressure and high heart rate readings. 



This AM 89/55  128

Prior to /72  135

After /71  136 93% on RA

At 12 p  99/66  116



Parameters for Zestril received, see updated order.

## 2019-07-18 NOTE — NUR
***DISCHARGE PLANNING

SECOND CALL PLACED TO COVERING , HERRERA T: [phone]

*REQUESTED AR RETURN PHONE CALL YESTERDAY AND AGAIN TODAY

NEED APPROVAL FOR ACUTE REHAB



*************************************************************************

CALLED : RAMONA BROWER

P:[phone]

P:[phone]

REQUESTED ARU PLACEMENT

RFA FORM FAXED TO  FROM DR BOOGIE'S OFFICE

WILL PROBABLY TAKE A DAY OR TWO FOR APPROVAL



*WHEN [facility] GOES OUT OF TOWN PLEASE CONTACT HERRERA T: [phone]

## 2019-07-18 NOTE — GENERAL SURGERY PROGRESS NOTE
General Surgery-Progress Note


Subjective


Procedure Performed


Right Total Knee Resurface Right


Symptoms:  improved





Objective





Last 24 Hour Vital Signs








  Date Time  Temp Pulse Resp B/P (MAP) Pulse Ox O2 Delivery O2 Flow Rate FiO2


 


7/18/19 08:47    89/55    


 


7/18/19 08:40  128  89/55 (66)    


 


7/18/19 08:00 99.4 120 18 103/64 (77) 94   


 


7/18/19 04:00 98.7 71 16 88/59 (69) 98   


 


7/18/19 00:00 98.4 77 16 88/54 (65) 100   


 


7/17/19 21:00      Room Air  


 


7/17/19 20:00 100.4 108 16 96/61 (73) 94   


 


7/17/19 16:00 97.9 123 18 99/55 (70) 100   


 


7/17/19 12:00   18     


 


7/17/19 12:00 99.9 113 16 104/69 (81) 93   








I&O











Intake and Output  


 


 7/17/19 7/18/19





 19:00 07:00


 


Intake Total  55 ml


 


Output Total 10 ml 2115 ml


 


Balance -10 ml -2060 ml


 


  


 


IV Total  55 ml


 


Output Urine Total  2100 ml


 


Drainage Total 10 ml 15 ml


 


# Voids 2 








Dressing:  dry


Wound:  clean


Drains:  hemovac





Laboratory Tests








Test


  7/18/19


05:45


 


White Blood Count


  12.6 K/UL


(4.8-10.8)  H


 


Red Blood Count


  2.62 M/UL


(4.70-6.10)  L


 


Hemoglobin


  8.1 G/DL


(14.2-18.0)  L


 


Hematocrit


  23.8 %


(42.0-52.0)  L


 


Mean Corpuscular Volume 91 FL (80-99)  


 


Mean Corpuscular Hemoglobin


  30.8 PG


(27.0-31.0)


 


Mean Corpuscular Hemoglobin


Concent 34.0 G/DL


(32.0-36.0)


 


Red Cell Distribution Width


  12.9 %


(11.6-14.8)


 


Platelet Count


  184 K/UL


(150-450)


 


Mean Platelet Volume


  5.5 FL


(6.5-10.1)  L


 


Neutrophils (%) (Auto)


  76.5 %


(45.0-75.0)  H


 


Lymphocytes (%) (Auto)


  11.1 %


(20.0-45.0)  L


 


Monocytes (%) (Auto)


  9.7 %


(1.0-10.0)


 


Eosinophils (%) (Auto)


  2.3 %


(0.0-3.0)


 


Basophils (%) (Auto)


  0.5 %


(0.0-2.0)


 


Sodium Level


  139 MMOL/L


(136-145)


 


Potassium Level


  4.3 MMOL/L


(3.5-5.1)


 


Chloride Level


  106 MMOL/L


()


 


Carbon Dioxide Level


  25 MMOL/L


(21-32)


 


Anion Gap


  8 mmol/L


(5-15)


 


Blood Urea Nitrogen


  19 mg/dL


(7-18)  H


 


Creatinine


  1.5 MG/DL


(0.55-1.30)  H


 


Estimat Glomerular Filtration


Rate 49.1 mL/min


(>60)


 


Glucose Level


  101 MG/DL


()


 


Calcium Level


  8.2 MG/DL


(8.5-10.1)  L


 


Iron Level


  19 ug/dL


()  L


 


Total Iron Binding Capacity


  203 ug/dL


(250-450)  L


 


Percent Iron Saturation 9 % (15-50)  L


 


Unsaturated Iron Binding


  184 ug/dL


(112-346)


 


Ferritin


  188 NG/ML


(8-388)








Additional Comments


drain removed today, may D/C Ancef.


slow progress with PT / OT


Dr. Jarrett following,





Assessment


Post-op Diagnosis


Patient is NOT able to discharge to home. He has No Stable housing to recover 

in. Needs Rehab and possible temporary housing. Request zacarias to insurance 

carrier 7-16-19











Phil Ashton Jul 18, 2019 10:54

## 2019-07-18 NOTE — GENERAL PROGRESS NOTE
Assessment/Plan


Assessment/Plan:


Right knee resurfacing arthroplasty


right knee arthritis


postop pain 


post op anemia


mild renal insufficiency 





PLAN


1. incentive spirometry


2. Lovenox


3. PT evaluation and therapy


4. Hydration; bolus; hold antihypertensives


5. Pain management


6. discharge once stable with outpatient follow up


7. iv iron





Subjective


Allergies:  


Coded Allergies:  


     No Known Allergies (Unverified , 7/12/19)


Subjective


care noted


improved hemodynamics





Objective





Last 24 Hour Vital Signs








  Date Time  Temp Pulse Resp B/P (MAP) Pulse Ox O2 Delivery O2 Flow Rate FiO2


 


7/18/19 08:47    89/55    


 


7/18/19 08:40  128  89/55 (66)    


 


7/18/19 08:00 99.4 120 18 103/64 (77) 94   


 


7/18/19 04:00 98.7 71 16 88/59 (69) 98   


 


7/18/19 00:00 98.4 77 16 88/54 (65) 100   


 


7/17/19 21:00      Room Air  


 


7/17/19 20:00 100.4 108 16 96/61 (73) 94   


 


7/17/19 16:00 97.9 123 18 99/55 (70) 100   


 


7/17/19 12:00   18     


 


7/17/19 12:00 99.9 113 16 104/69 (81) 93   

















Intake and Output  


 


 7/17/19 7/18/19





 19:00 07:00


 


Intake Total  55 ml


 


Output Total 10 ml 2115 ml


 


Balance -10 ml -2060 ml


 


  


 


IV Total  55 ml


 


Output Urine Total  2100 ml


 


Drainage Total 10 ml 15 ml


 


# Voids 2 








Laboratory Tests


7/18/19 05:45: 


White Blood Count 12.6H, Red Blood Count 2.62L, Hemoglobin 8.1L, Hematocrit 

23.8L, Mean Corpuscular Volume 91, Mean Corpuscular Hemoglobin 30.8, Mean 

Corpuscular Hemoglobin Concent 34.0, Red Cell Distribution Width 12.9, Platelet 

Count 184, Mean Platelet Volume 5.5L, Neutrophils (%) (Auto) 76.5H, Lymphocytes 

(%) (Auto) 11.1L, Monocytes (%) (Auto) 9.7, Eosinophils (%) (Auto) 2.3, 

Basophils (%) (Auto) 0.5, Sodium Level 139, Potassium Level 4.3, Chloride Level 

106, Carbon Dioxide Level 25, Anion Gap 8, Blood Urea Nitrogen 19H, Creatinine 

1.5H, Estimat Glomerular Filtration Rate 49.1, Glucose Level 101, Calcium Level 

8.2L


Height (Feet):  5


Height (Inches):  5.00


Weight (Pounds):  220


Objective


WDWN


NAD


clear breath sounds bilaterally without rhonchi or wheeze


[id number] without MRG


NABS nontender no HSM


no CCE


nonfocal











Suraj Jarrett MD Jul 18, 2019 09:11

## 2019-07-18 NOTE — NUR
NURSE NOTES:

Report received from Vicente Donovan RN, rounds made. Patient resting in supine position in bed. 
Alert, oriented x4, calm. LW heplock intact. Pain to right knee 7/10, will medicate as 
ordered. No distress on RA. CMS + to RLE, warm, wiggles, +1 pitting edema noted, no NT, 
dressing CDI, hemovac in place. Call light in reach, bed in lowest position, will continue 
to monitor. 

-------------------------------------------------------------------------------

Addendum: 07/18/19 at 0902 by Dennise Brenner RN

-------------------------------------------------------------------------------

Ice pack to right knee as ordered.

## 2019-07-18 NOTE — NUR
CASE MANAGEMENT:REVIEW



7/18/19

SI: POD #3

RT KNEE RESURFACING ARTHROPLASTY

99.4   120   18  99/66  95% ON RA

WBC+12.6   H/H-8.1/23.8    BUN+19   CR+1.5



IS: IV VENOFER QHS

PROTONIX PO QD

LOVENOX SQ QD

OXYCONTIN PO Q12

**: MED/SURG STATUS 3 [facility]



PLAN:

REFER TO ACUTE REHAB FOR RECOVERY

TWO MESSAGES LEFT FOR  CASE MANAGEMENT:REVIEW



7/17/19

SI: POD #2

RT KNEE RESURFACING ARTHROPLASTY

99.1   100  18  89/48   92% ON RA

WBC+15.1   H/H-8.7/25.2



IS: IV ANCEF Q8HRS

LISINOPRIL PO QD

IV ANCEF Q8HR

MORPHINE PCA

LOVENOX

**: MED/SURG STATUS 3 [facility]



PLAN:

REFER TO ACUTE REHAB FOR RECOVERY

RFA WAS COMPLETED AND FAXED ON MONDAY 7/15/19

TWO MESSAGES LEFT FOR , HERRERA T: [phone]

## 2019-07-18 NOTE — NUR
NURSE NOTES:

Received report SONAL Bowden and rounds made with outgoing nurse. Received pt lying in bed, 
AOX4, pain level 5/10, pain medication not yet due, no distress noted. R knee dressing 
C/D/I. IV L wrist patent and intact. Bed in lowest position and locked, side rails up x 2, 
call light within reach. Will continue to monitor.

## 2019-07-18 NOTE — NUR
PT NOTE

Attempted to see patient for PT treatment. Patient declining to participate with PT, c/o not 
feeling well. Dennise PRUITT notified, will follow up in the a.m.

## 2019-07-19 VITALS — DIASTOLIC BLOOD PRESSURE: 59 MMHG | SYSTOLIC BLOOD PRESSURE: 98 MMHG

## 2019-07-19 VITALS — SYSTOLIC BLOOD PRESSURE: 104 MMHG | DIASTOLIC BLOOD PRESSURE: 63 MMHG

## 2019-07-19 VITALS — DIASTOLIC BLOOD PRESSURE: 67 MMHG | SYSTOLIC BLOOD PRESSURE: 107 MMHG

## 2019-07-19 VITALS — SYSTOLIC BLOOD PRESSURE: 130 MMHG | DIASTOLIC BLOOD PRESSURE: 74 MMHG

## 2019-07-19 VITALS — DIASTOLIC BLOOD PRESSURE: 64 MMHG | SYSTOLIC BLOOD PRESSURE: 103 MMHG

## 2019-07-19 VITALS — DIASTOLIC BLOOD PRESSURE: 57 MMHG | SYSTOLIC BLOOD PRESSURE: 91 MMHG

## 2019-07-19 RX ADMIN — ACETAMINOPHEN SCH MG: 500 TABLET, FILM COATED ORAL at 19:05

## 2019-07-19 RX ADMIN — DOCUSATE SODIUM SCH MG: 100 CAPSULE, LIQUID FILLED ORAL at 13:51

## 2019-07-19 RX ADMIN — ACETAMINOPHEN SCH MG: 500 TABLET, FILM COATED ORAL at 10:44

## 2019-07-19 RX ADMIN — OXYCODONE HYDROCHLORIDE SCH MG: 20 TABLET, FILM COATED, EXTENDED RELEASE ORAL at 21:07

## 2019-07-19 RX ADMIN — DOCUSATE SODIUM SCH MG: 100 CAPSULE, LIQUID FILLED ORAL at 19:05

## 2019-07-19 RX ADMIN — ENOXAPARIN SODIUM SCH MG: 40 INJECTION SUBCUTANEOUS at 10:43

## 2019-07-19 RX ADMIN — LISINOPRIL SCH MG: 10 TABLET ORAL at 09:00

## 2019-07-19 RX ADMIN — ACETAMINOPHEN SCH MG: 500 TABLET, FILM COATED ORAL at 13:51

## 2019-07-19 RX ADMIN — DOCUSATE SODIUM SCH MG: 100 CAPSULE, LIQUID FILLED ORAL at 10:42

## 2019-07-19 RX ADMIN — OXYCODONE HYDROCHLORIDE SCH MG: 20 TABLET, FILM COATED, EXTENDED RELEASE ORAL at 10:46

## 2019-07-19 RX ADMIN — SODIUM CHLORIDE SCH MLS/HR: 0.9 INJECTION INTRAVENOUS at 21:07

## 2019-07-19 NOTE — NUR
HOMELESS COORDINATOR





 tried to reach Mental Health Worker (Jeremy) who is currently still on vacation. HC was 
able to speak with Kenyatta Chanelle [phone], she stated she will apply for emergency IHP bed 
located at different shelter today, and will follow-up with me Monday about approval.



Patient continues to require medical intervention. Will continue to monitor and assist as 
needed.

## 2019-07-19 NOTE — NUR
NURSE NOTES:

Report received from Vicente Donovan RN, rounds made. Patient resting in supine position in bed. 
Patient alert, oriented x4 calm, no distress on RA, pain 7/10 to right knee, ice pack in 
place, will medicate as scheduled. LW heplock intact. Right knee dressing ace wrap, CDI, 
will change as ordered. Call light in reach, bed in lowest position, will continue to 
monitor.

## 2019-07-19 NOTE — NUR
***DISCHARGE PLANNING

RFA WAS RECEIVED

WAITING FOR AUTHORIZATION FOR REHAB

CLINICALS FAXED TO [facility]

## 2019-07-19 NOTE — NUR
*-* INSURANCE *-*



UPDATED CLINICALS AND REVIEWS HAVE BEEN FAXED 



CALLED : RAMONA BROWER

P:[phone]

P:[phone]

F: [phone]
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
## 2019-07-19 NOTE — NUR
NURSE NOTES:

Received report from Sagrario PRUITT. Patient is awake and oriented, no acute distress noted, 
reporting pain is well managed at this time, PCA settings checked and verified against 
order, hemovac compressed. All needs met at this time. Will continue to monitor. no

## 2019-07-19 NOTE — GENERAL PROGRESS NOTE
Assessment/Plan


Assessment/Plan:


Right knee resurfacing arthroplasty


right knee arthritis


postop pain 


post op anemia


mild renal insufficiency 





PLAN


1. incentive spirometry


2. Lovenox


3. PT evaluation and therapy


4. Hydration; bolus; hold antihypertensives


5. Pain management


6. discharge planned to rehab pending approval 


7. iv iron





Subjective


Allergies:  


Coded Allergies:  


     No Known Allergies (Unverified , 7/12/19)


Subjective


care noted


stable hemodynamics





Objective





Last 24 Hour Vital Signs








  Date Time  Temp Pulse Resp B/P (MAP) Pulse Ox O2 Delivery O2 Flow Rate FiO2


 


7/19/19 12:00 98.0 118 19 107/67 (80) 100   


 


7/19/19 09:00      Room Air  


 


7/19/19 09:00    98/59    


 


7/19/19 08:00 98.2 124 19 98/59 (72) 100   


 


7/19/19 05:35 99.6 109 18 103/64 (77) 97   


 


7/19/19 00:00 97.8 70 18 130/74 (92) 98   


 


7/18/19 21:00      Room Air  


 


7/18/19 20:00 97.6 115 16 110/71 (84) 93   


 


7/18/19 16:00 98.0 107 18 91/60 (70) 95   

















Intake and Output  


 


 7/18/19 7/19/19





 19:00 07:00


 


Intake Total 958 ml 660 ml


 


Output Total 1575 ml 3000 ml


 


Balance -617 ml -2340 ml


 


  


 


Intake Oral 958 ml 600 ml


 


IV Total  60 ml


 


Output Urine Total 1575 ml 1500 ml


 


Stool Total  1500 ml


 


# Voids 4 








Height (Feet):  5


Height (Inches):  5.00


Weight (Pounds):  220


Objective


WDWN


NAD


clear breath sounds bilaterally without rhonchi or wheeze


[id number] without MRG


NABS nontender no HSM


no CCE


nonfocal











Suraj Jarrett MD Jul 19, 2019 15:46

## 2019-07-19 NOTE — NUR
NURSE NOTES:

Refused dressing change to right knee. Pt states "I want it done after breakfast". Will 
endorse to next nurse.

## 2019-07-19 NOTE — NUR
NURSE NOTES:

Patient up ambulated in halls with PT x2, on CPM 0-70 degrees for 2 hours, tolerated well, 
pain 7-9/10. Dressing changed, dry sterile dressing with tegederm, staples and xeroform 
noted, intact, mild swelling/bruising, warmth.  Ice pack applied top and bottom. Will 
continue to monitor.

## 2019-07-19 NOTE — NUR
CASE MANAGEMENT:REVIEW



7/19/19

SI: POD #4

RT KNEE RESURFACING ARTHROPLASTY

99..6   124  19  98/59  100% ON RA



IS: IV VENOFER QHS

PROTONIX PO QD

LOVENOX SQ QD

OXYCONTIN PO Q12

**: MED/SURG STATUS 3 [facility]





PLAN:

PLAN IS FOR PATIENT TO DISCHARGE TO REHAB FACILITY

RFA HAS BEEN FAXED.SPOKE WITH HERRERA YESTERDAY...WAITING FOR AUTHORIZATION

FAXED CLINICALS TO [facility]

## 2019-07-20 VITALS — DIASTOLIC BLOOD PRESSURE: 60 MMHG | SYSTOLIC BLOOD PRESSURE: 104 MMHG

## 2019-07-20 VITALS — SYSTOLIC BLOOD PRESSURE: 102 MMHG | DIASTOLIC BLOOD PRESSURE: 61 MMHG

## 2019-07-20 VITALS — DIASTOLIC BLOOD PRESSURE: 72 MMHG | SYSTOLIC BLOOD PRESSURE: 116 MMHG

## 2019-07-20 VITALS — DIASTOLIC BLOOD PRESSURE: 65 MMHG | SYSTOLIC BLOOD PRESSURE: 114 MMHG

## 2019-07-20 VITALS — SYSTOLIC BLOOD PRESSURE: 108 MMHG | DIASTOLIC BLOOD PRESSURE: 60 MMHG

## 2019-07-20 VITALS — SYSTOLIC BLOOD PRESSURE: 115 MMHG | DIASTOLIC BLOOD PRESSURE: 73 MMHG

## 2019-07-20 VITALS — SYSTOLIC BLOOD PRESSURE: 98 MMHG | DIASTOLIC BLOOD PRESSURE: 62 MMHG

## 2019-07-20 RX ADMIN — ACETAMINOPHEN SCH MG: 500 TABLET, FILM COATED ORAL at 11:00

## 2019-07-20 RX ADMIN — ACETAMINOPHEN SCH MG: 500 TABLET, FILM COATED ORAL at 14:07

## 2019-07-20 RX ADMIN — DOCUSATE SODIUM SCH MG: 100 CAPSULE, LIQUID FILLED ORAL at 09:50

## 2019-07-20 RX ADMIN — ACETAMINOPHEN SCH MG: 500 TABLET, FILM COATED ORAL at 19:05

## 2019-07-20 RX ADMIN — OXYCODONE HYDROCHLORIDE SCH MG: 20 TABLET, FILM COATED, EXTENDED RELEASE ORAL at 09:53

## 2019-07-20 RX ADMIN — SODIUM CHLORIDE SCH MLS/HR: 0.9 INJECTION INTRAVENOUS at 20:28

## 2019-07-20 RX ADMIN — ENOXAPARIN SODIUM SCH MG: 40 INJECTION SUBCUTANEOUS at 09:49

## 2019-07-20 RX ADMIN — OXYCODONE HYDROCHLORIDE SCH MG: 20 TABLET, FILM COATED, EXTENDED RELEASE ORAL at 20:30

## 2019-07-20 RX ADMIN — DOCUSATE SODIUM SCH MG: 100 CAPSULE, LIQUID FILLED ORAL at 19:04

## 2019-07-20 RX ADMIN — LISINOPRIL SCH MG: 10 TABLET ORAL at 10:18

## 2019-07-20 RX ADMIN — DOCUSATE SODIUM SCH MG: 100 CAPSULE, LIQUID FILLED ORAL at 14:07

## 2019-07-20 NOTE — NUR
ANNIKA GUTIERREZ RN

-------------------------------------------------------------------------------

Addendum: 07/20/19 at 0750 by LEONARDO WOODS RN

-------------------------------------------------------------------------------

HAND-OFF: 

Report given to ANNIKA GUTIERREZ RN.

## 2019-07-20 NOTE — NUR
NURSE NOTES:

Report received from Warner RN, rounds made. Patient sleeping (awakes easily to name) in 
supine position in bed, no distress on RA. Right knee dressing CDI, no drainage or bleeding 
noted. Ice pack applied to top and bottom of right knee. Pain to right knee 7/10, will 
medicate as ordered. CMS +, skin warm, wiggles, no NT, hand grasps/pedal pushes intact, 3/5 
to RLE. LW heplock intact. Call light in reach, bed in lowest position, will continue to 
monitor.

## 2019-07-21 VITALS — DIASTOLIC BLOOD PRESSURE: 61 MMHG | SYSTOLIC BLOOD PRESSURE: 102 MMHG

## 2019-07-21 VITALS — DIASTOLIC BLOOD PRESSURE: 57 MMHG | SYSTOLIC BLOOD PRESSURE: 93 MMHG

## 2019-07-21 VITALS — DIASTOLIC BLOOD PRESSURE: 71 MMHG | SYSTOLIC BLOOD PRESSURE: 115 MMHG

## 2019-07-21 VITALS — SYSTOLIC BLOOD PRESSURE: 104 MMHG | DIASTOLIC BLOOD PRESSURE: 62 MMHG

## 2019-07-21 VITALS — SYSTOLIC BLOOD PRESSURE: 92 MMHG | DIASTOLIC BLOOD PRESSURE: 48 MMHG

## 2019-07-21 VITALS — SYSTOLIC BLOOD PRESSURE: 110 MMHG | DIASTOLIC BLOOD PRESSURE: 60 MMHG

## 2019-07-21 RX ADMIN — OXYCODONE HYDROCHLORIDE SCH MG: 20 TABLET, FILM COATED, EXTENDED RELEASE ORAL at 20:47

## 2019-07-21 RX ADMIN — LISINOPRIL SCH MG: 10 TABLET ORAL at 09:00

## 2019-07-21 RX ADMIN — DOCUSATE SODIUM SCH MG: 100 CAPSULE, LIQUID FILLED ORAL at 18:23

## 2019-07-21 RX ADMIN — ENOXAPARIN SODIUM SCH MG: 40 INJECTION SUBCUTANEOUS at 09:11

## 2019-07-21 RX ADMIN — ACETAMINOPHEN SCH MG: 500 TABLET, FILM COATED ORAL at 12:52

## 2019-07-21 RX ADMIN — ACETAMINOPHEN SCH MG: 500 TABLET, FILM COATED ORAL at 18:23

## 2019-07-21 RX ADMIN — DOCUSATE SODIUM SCH MG: 100 CAPSULE, LIQUID FILLED ORAL at 09:08

## 2019-07-21 RX ADMIN — DOCUSATE SODIUM SCH MG: 100 CAPSULE, LIQUID FILLED ORAL at 12:51

## 2019-07-21 RX ADMIN — OXYCODONE HYDROCHLORIDE SCH MG: 20 TABLET, FILM COATED, EXTENDED RELEASE ORAL at 09:06

## 2019-07-21 RX ADMIN — SODIUM CHLORIDE SCH MLS/HR: 0.9 INJECTION INTRAVENOUS at 20:47

## 2019-07-21 RX ADMIN — ACETAMINOPHEN SCH MG: 500 TABLET, FILM COATED ORAL at 09:00

## 2019-07-21 RX ADMIN — LISINOPRIL SCH MG: 10 TABLET ORAL at 09:15

## 2019-07-21 NOTE — GENERAL PROGRESS NOTE
Assessment/Plan


Assessment/Plan:


Right knee resurfacing arthroplasty


right knee arthritis


postop pain 


post op anemia


mild renal insufficiency 





PLAN


1. incentive spirometry


2. Lovenox


3. PT evaluation and therapy


4. Hydration; bolus; hold antihypertensives


5. Pain management


6. discharge planned to rehab pending approval 


7. iv iron





Subjective


Allergies:  


Coded Allergies:  


     No Known Allergies (Unverified , 7/12/19)


Subjective


care noted


stable hemodynamics





Objective





Last 24 Hour Vital Signs








  Date Time  Temp Pulse Resp B/P (MAP) Pulse Ox O2 Delivery O2 Flow Rate FiO2


 


7/21/19 16:24 99.0  19 102/61 (75) 97   


 


7/21/19 12:00 99.1 104 18 115/71 (86) 100   


 


7/21/19 09:15    104/62    


 


7/21/19 07:50 98.2 109 22 104/62 (76) 94   


 


7/21/19 04:00 97.9 98 18 92/48 (63) 96   


 


7/21/19 00:00 99.5 106 18 93/57 (69) 93   


 


7/20/19 21:00      Room Air  


 


7/20/19 20:00 98.5 107 16 102/61 (75) 98   

















Intake and Output  


 


 7/20/19 7/21/19





 19:00 07:00


 


Intake Total 1196 ml 500 ml


 


Output Total 2850 ml 1200 ml


 


Balance -1654 ml -700 ml


 


  


 


Intake Oral 1196 ml 500 ml


 


Output Urine Total 2850 ml 1200 ml


 


# Voids 7 


 


# Bowel Movements 2 








Height (Feet):  5


Height (Inches):  5.00


Weight (Pounds):  220


Objective


WDWN


NAD


clear breath sounds bilaterally without rhonchi or wheeze


[id number] without MRG


NABS nontender no HSM


no CCE


nonfocal











Suraj Jarrett MD Jul 21, 2019 18:11

## 2019-07-21 NOTE — GENERAL PROGRESS NOTE
Assessment/Plan


Assessment/Plan:


Right knee resurfacing arthroplasty


right knee arthritis


postop pain 


post op anemia


mild renal insufficiency 





PLAN


1. incentive spirometry


2. Lovenox


3. PT evaluation and therapy


4. Hydration; bolus; hold antihypertensives


5. Pain management


6. discharge planned to rehab pending approval 


7. iv iron





Subjective


Date patient seen:  Jul 20, 2019


Allergies:  


Coded Allergies:  


     No Known Allergies (Unverified , 7/12/19)


Subjective


care noted


late entry 


stable hemodynamics





Objective





Last 24 Hour Vital Signs








  Date Time  Temp Pulse Resp B/P (MAP) Pulse Ox O2 Delivery O2 Flow Rate FiO2


 


        


 


        


 


        


 


        


 


        


 


        


 


7/20/19 21:00      Room Air  


 


7/20/19 20:00 98.5 107 16 102/61 (75) 98   

















Intake and Output  


 


 7/20/19 7/21/19





 19:00 07:00


 


Intake Total 1196 ml 500 ml


 


Output Total 2850 ml 1200 ml


 


Balance -1654 ml -700 ml


 


  


 


Intake Oral 1196 ml 500 ml


 


Output Urine Total 2850 ml 1200 ml


 


# Voids 7 


 


# Bowel Movements 2 








Height (Feet):  5


Height (Inches):  5.00


Weight (Pounds):  220


Objective


WDWN


NAD


clear breath sounds bilaterally without rhonchi or wheeze


[id number] without MRG


NABS nontender no HSM


no CCE


nonfocal











Suraj Jarrett MD Jul 21, 2019 18:10

## 2019-07-21 NOTE — NUR
Cardiology Discharge Summary     Patient ID:  Rambo Dick  [id number]  68 y.o.  1944    Allergies: Nuts Algis Chema nut] and Sulfa (sulfonamide antibiotics)    Admit Date: 8/12/2018    Discharge Date: 8/14/2018     Admitting Physician: Malgorzata Gomez MD     Discharge Physician: Hiro Sanford MD    * Admission Diagnoses: Chest pain  Elevated troponin  Chest pain    * Discharge Diagnoses:   Hospital Problems as of 8/14/2018  Date Reviewed: 5/31/2018          Codes Class Noted - Resolved POA    * (Principal)Chest pain ICD-10-CM: R07.9  ICD-9-CM: 786.50  8/12/2018 - Present Unknown        Elevated troponin ICD-10-CM: R74.8  ICD-9-CM: 790.6  8/12/2018 - Present Unknown              * Discharge Condition: good and improved    * Cardiology Procedures this Admission:  Left heart catheterization with PCI  Cardiology and IR Orders (Through next 24h)    Start     Ordered    08/13/18 0700  CARDIAC CATHETERIZATION  [[id number]]  ONE TIME      08/13/18 0659          * Hospital Course:   Ms Peterson Rebollar is a 67 yo F who was admitted with indigestion symptoms that were concerning for ACS  Her troponin was positive, peaking at  She was taken to the cath lab on the AM of 8/13 and was found to have a 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   Planned duration of DAPT will be 12 months    Lab Results   Component Value Date/Time    Cholesterol, total 217 (H) 05/22/2018 08:34 AM    HDL Cholesterol 72 05/22/2018 08:34 AM    LDL, calculated 128 (H) 05/22/2018 08:34 AM    VLDL, calculated 17 05/22/2018 08:34 AM    Triglyceride 87 05/22/2018 08:34 AM    CHOL/HDL Ratio 3.7 05/08/2013 04:04 AM       Consults: None    Discharge Exam:     Visit Vitals    /78 (BP 1 Location: Right arm, BP Patient Position: At rest;Sitting)    Pulse 71    Temp 98.5 °F (36.9 °C)    Resp 18    Ht 5' 3\" (1.6 m) HAND-OFF: 

Report given to SONAL Bowden. Pt does not show cough after medication. Will continue to monitor.

-------------------------------------------------------------------------------

Addendum: 07/21/19 at 0805 by Annie Willoughby RN

-------------------------------------------------------------------------------

CORRECTION: INCORRECT DOCUMENTATION  Wt 63.5 kg (139 lb 15.9 oz)    SpO2 97%    BMI 24.8 kg/m2     General Appearance:  Well developed, well nourished,alert and oriented x 3, and individual in no acute distress. Ears/Nose/Mouth/Throat:   Hearing grossly normal.         Neck: Supple. Chest:   Lungs clear to auscultation bilaterally. Cardiovascular:  Regular rate and rhythm, S1, S2 normal, no murmur. Abdomen:   Soft, non-tender, bowel sounds are active. Extremities: No edema bilaterally. Skin: Warm and dry. * Disposition: Home    Discharge Medications:   Current Discharge Medication List      START taking these medications    Details   metoprolol tartrate (LOPRESSOR) 25 mg tablet Take 0.5 Tabs by mouth every twelve (12) hours. Qty: 60 Tab, Refills: 3      rosuvastatin (CRESTOR) 20 mg tablet Take 1 Tab by mouth daily. Qty: 90 Tab, Refills: 3      ticagrelor (BRILINTA) 90 mg tablet Take 1 Tab by mouth every twelve (12) hours every twelve (12) hours. Qty: 60 Tab, Refills: 11         CONTINUE these medications which have NOT CHANGED    Details   buPROPion (WELLBUTRIN) 75 mg tablet Take 75 mg by mouth daily.      s-adenosylmethionine sul tosyl (ROBERTO-E PO) Take  by mouth.      mometasone (ELOCON) 0.1 % lotion APPLY 1 BEAD TO SCALP TWICE DAILY AS NEEDED  Refills: 0      mometasone (ELOCON) 0.1 % topical cream Refills: 0      amLODIPine (NORVASC) 5 mg tablet take 1 tablet by mouth once daily  Qty: 90 Tab, Refills: 4    Associated Diagnoses: Essential hypertension      ipratropium (ATROVENT) 0.06 % nasal spray       oxybutynin chloride XL (DITROPAN XL) 10 mg CR tablet TAKE ONE TABLET DAILY. Qty: 90 Tab, Refills: 4    Associated Diagnoses: Overactive bladder      levalbuterol tartrate (XOPENEX) 45 mcg/actuation inhaler Take 1-2 Puffs by inhalation every four (4) hours as needed for Wheezing. Qty: 1 Inhaler, Refills: 2    Associated Diagnoses: Bronchitis      FLAXSEED PO Take  by mouth daily.  Flaxseed meal      cetirizine (ZYRTEC) 10 mg tablet Take 10 mg by mouth daily. folic acid 520 mcg tablet Take 800 mcg by mouth daily. aspirin 81 mg chewable tablet Take 81 mg by mouth daily. cyanocobalamin (VITAMIN B-12) 1,000 mcg tablet Take 1,000 mcg by mouth daily. montelukast (SINGULAIR) 10 mg tablet Take 10 mg by mouth every seven (7) days. Takes on day when she gets allergy shots      Biotin 2,500 mcg cap Take 1 Cap by mouth daily. STOP taking these medications       naproxen sodium (ALEVE) 220 mg cap Comments:   Reason for Stopping:         ibuprofen 200 mg cap Comments:   Reason for Stopping:               * Follow-up Care/Patient Instructions: Activity:Activity as tolerated  Diet: Cardiac Diet  Wound Care: None needed    Follow-up Information     Follow up With Details Comments Contact Info Additional Information    Gumaro Naranjo. is scheduled for 8/23/2018 at 1:00pm. arrive about 15 minutes prior to appt. wear comfortable clothing. bring medication list and insurance cards. this is a 2 hour time [address] [place] [address] [address]  [employer] [id number] [phone] [facility] [address], [address]. Please arrive 15 minutes prior to your appointment time and you will register in the [facility], [address], on the first floor of the [address] Telephone: [phone] Fax: [phone] Driving directions To [facility]. Building: Driving WEST on [address], take [address] to [employer]. Turn left onto [facility] [address], then turn right into [employer] [address] [address] Driving EAST on [address], take exit [address] [facility]. Turn right at the end of the exit ramp. Turn left onto [address], then turn right into [employer] lot.     Edwige Reilly MD   [address]  [phone]       Alexis Vasquez MD Schedule an appointment as soon as possible for a visit in 2 weeks  [address] [phone] [facility]             Signed:  Mel Hernandez MD  8/14/2018  8:02 AM

## 2019-07-21 NOTE — NUR
NURSE NOTES:

Received patient awake in bed, no s/s of acute distress, no c/o pain at this time. Surgical 
dressing dry and intact, changed by AM nurse. Fall and safety precautions taken. IV access 
asymptomatic, dressing dry and intact.

## 2019-07-21 NOTE — NUR
NURSE NOTES:

Report received from [facility], rounds made. Patient resting in supine position in bed. No 
distress on RA. Pain to right knee 6/10, will medicate as ordered. Right knee dressing CDI. 
Appetite good, no NV. Voids well. LW heplock intact. Call light in reach, bed in lowest 
position, will continue to monitor.

## 2019-07-21 NOTE — GENERAL SURGERY PROGRESS NOTE
General Surgery-Progress Note


Subjective


Procedure Performed


Right Total Knee Resurface Right


Symptoms:  improved





Objective





Last 24 Hour Vital Signs








  Date Time  Temp Pulse Resp B/P (MAP) Pulse Ox O2 Delivery O2 Flow Rate FiO2


 


7/21/19 09:15    104/62    


 


7/21/19 04:00 97.9 98 18 92/48 (63) 96   


 


7/21/19 00:00 99.5 106 18 93/57 (69) 93   


 


7/20/19 21:00      Room Air  


 


7/20/19 20:00 98.5 107 16 102/61 (75) 98   


 


7/20/19 16:27  101 19 108/60 (76) 97   


 


7/20/19 12:00 98.6 107 20 104/60 (75) 99   








I&O











Intake and Output  


 


 7/20/19 7/21/19





 18:59 06:59


 


Intake Total 1196 ml 500 ml


 


Output Total 2850 ml 1200 ml


 


Balance -1654 ml -700 ml


 


  


 


Intake Oral 1196 ml 500 ml


 


Output Urine Total 2850 ml 1200 ml


 


# Voids 7 


 


# Bowel Movements 2 








Dressing:  dry


Wound:  clean





Assessment


Post-op Diagnosis


Patient is NOT able to discharge to home. He has No Stable housing to recover 

in. Needs Rehab and possible temporary housing. Request zacarias to insurance 

carrier 7-16-19





Plan


Additional Comments


Wound clean and dry, pulses good, tolerating CPM.


Dr. Jarrett following.


Pending rehab or alternative living arrangements.











Phil Ashton Jul 21, 2019 10:16

## 2019-07-21 NOTE — NUR
NURSE NOTES:

Right knee dressing changed (staples intact, xeroform in place), noted small area skin tear, 
no drainage or odor, surrounding skin intact (applied folded 4x4 gauze and secured with 
tegederm to surgical site and small skin tear). Ice pack x2 in place. Patient offered to 
shower today, refused, would like to shower tomorrow instead. Will endorse above to next 
shift.

## 2019-07-22 VITALS — SYSTOLIC BLOOD PRESSURE: 101 MMHG | DIASTOLIC BLOOD PRESSURE: 63 MMHG

## 2019-07-22 VITALS — SYSTOLIC BLOOD PRESSURE: 114 MMHG | DIASTOLIC BLOOD PRESSURE: 75 MMHG

## 2019-07-22 VITALS — DIASTOLIC BLOOD PRESSURE: 68 MMHG | SYSTOLIC BLOOD PRESSURE: 110 MMHG

## 2019-07-22 VITALS — SYSTOLIC BLOOD PRESSURE: 102 MMHG | DIASTOLIC BLOOD PRESSURE: 73 MMHG

## 2019-07-22 VITALS — SYSTOLIC BLOOD PRESSURE: 103 MMHG | DIASTOLIC BLOOD PRESSURE: 70 MMHG

## 2019-07-22 VITALS — DIASTOLIC BLOOD PRESSURE: 72 MMHG | SYSTOLIC BLOOD PRESSURE: 110 MMHG

## 2019-07-22 RX ADMIN — ACETAMINOPHEN SCH MG: 500 TABLET, FILM COATED ORAL at 13:29

## 2019-07-22 RX ADMIN — DOCUSATE SODIUM SCH MG: 100 CAPSULE, LIQUID FILLED ORAL at 17:03

## 2019-07-22 RX ADMIN — DOCUSATE SODIUM SCH MG: 100 CAPSULE, LIQUID FILLED ORAL at 13:28

## 2019-07-22 RX ADMIN — ACETAMINOPHEN SCH MG: 500 TABLET, FILM COATED ORAL at 17:04

## 2019-07-22 RX ADMIN — DOCUSATE SODIUM SCH MG: 100 CAPSULE, LIQUID FILLED ORAL at 09:00

## 2019-07-22 RX ADMIN — SODIUM CHLORIDE SCH MLS/HR: 0.9 INJECTION INTRAVENOUS at 20:32

## 2019-07-22 RX ADMIN — OXYCODONE HYDROCHLORIDE SCH MG: 20 TABLET, FILM COATED, EXTENDED RELEASE ORAL at 20:50

## 2019-07-22 RX ADMIN — ENOXAPARIN SODIUM SCH MG: 40 INJECTION SUBCUTANEOUS at 08:40

## 2019-07-22 RX ADMIN — ACETAMINOPHEN SCH MG: 500 TABLET, FILM COATED ORAL at 08:38

## 2019-07-22 RX ADMIN — OXYCODONE HYDROCHLORIDE SCH MG: 20 TABLET, FILM COATED, EXTENDED RELEASE ORAL at 08:37

## 2019-07-22 NOTE — NUR
***DISCHARGE PLANNING

PATIENT WAS REFERRED TO 

[facility] ~ THEY DECLINED BECAUSE OF WORKERS COMP

[facility]  ~ THEY DECLINED D/T NO BEDS AVAILABLE AT THIS TIME



FAXED TO 

[facility]

BEA AND ADRIANA SAID THEY WILL EXCEPT ~ THEY ARE WAITING FOR A RETURN CALL FROM THE ADJUSTOR

THIS  FAXED THE AUTHORIZATION PAPERWORK TO [facility] BUT THEY WANT TO SPEAK WITH 

THE COVERING  WHICH IS JAYDEN T: [phone]



*****WAITING FOR ROOM ASSIGNMENT

## 2019-07-22 NOTE — NUR
NURSE NOTES:

Receive a report from SONAL Fang. Done rounds. Pt is awake and alert. No acute distress 
noted. Right knee op site is remained with gauze and tegarderm. Will continue to monitor.

## 2019-07-22 NOTE — NUR
NURSE NOTES:

Pt resting in bed. Dressing R knee CDI, able to move toes, no numbness, no tingling, warm to 
touch. pt denies pain am. Voiding, no BM. call light within reach, bed in low position, bed 
alarm on. fall precaution maintained. will continue to monitor.

## 2019-07-22 NOTE — NUR
NURSE NOTES:

Pt requests for sleeping pill. Notify Dr. Jarrett and receive an order to renew Temazepam 
15mg 1 cap po for insomnia. Order noted and carried out. Will continue to monitor.

## 2019-07-22 NOTE — NUR
*-* INSURANCE *-*



UPDATED CLINICALS AND REVIEWS HAVE BEEN FAXED 



CALLED : RAMONA BROWER

P:[phone]

P:[phone]

F: [phone]

## 2019-07-22 NOTE — GENERAL PROGRESS NOTE
Assessment/Plan


Assessment/Plan:


Right knee resurfacing arthroplasty


right knee arthritis


postop pain 


post op anemia


mild renal insufficiency 





PLAN


1. incentive spirometry


2. Lovenox


3. PT evaluation and therapy


4. Hydration; bolus; hold antihypertensives


5. Pain management


6. discharge planned to rehab pending approval 


7. iv iron





Subjective


Allergies:  


Coded Allergies:  


     No Known Allergies (Unverified , 7/12/19)


Subjective


care noted


stable hemodynamics





Objective





Last 24 Hour Vital Signs








  Date Time  Temp Pulse Resp B/P (MAP) Pulse Ox O2 Delivery O2 Flow Rate FiO2


 


7/22/19 04:00 98.9 99 18 110/68 (82) 97   


 


7/22/19 00:00 98.3  16 103/70 (81) 97   


 


7/21/19 22:35      Room Air  


 


7/21/19 21:17 99.0       


 


7/21/19 20:00 97.7  17 110/60 (77) 97   


 


7/21/19 16:24 99.0  19 102/61 (75) 97   


 


7/21/19 12:00 99.1 104 18 115/71 (86) 100   


 


7/21/19 09:15    104/62    


 


7/21/19 09:00      Room Air  

















Intake and Output  


 


 7/21/19 7/22/19





 19:00 07:00


 


Intake Total 1182 ml 300 ml


 


Output Total 1800 ml 


 


Balance -618 ml 300 ml


 


  


 


Intake Oral 1182 ml 300 ml


 


Output Urine Total 1800 ml 


 


# Voids 6 2


 


# Bowel Movements 1 1








Height (Feet):  5


Height (Inches):  5.00


Weight (Pounds):  220


Objective


WDWN


NAD


clear breath sounds bilaterally without rhonchi or wheeze


[id number] without MRG


NABS nontender no HSM


no CCE


nonfocal











Suraj Jarrett MD Jul 22, 2019 08:21

## 2019-07-23 VITALS — DIASTOLIC BLOOD PRESSURE: 64 MMHG | SYSTOLIC BLOOD PRESSURE: 98 MMHG

## 2019-07-23 VITALS — DIASTOLIC BLOOD PRESSURE: 64 MMHG | SYSTOLIC BLOOD PRESSURE: 116 MMHG

## 2019-07-23 VITALS — SYSTOLIC BLOOD PRESSURE: 118 MMHG | DIASTOLIC BLOOD PRESSURE: 71 MMHG

## 2019-07-23 VITALS — DIASTOLIC BLOOD PRESSURE: 57 MMHG | SYSTOLIC BLOOD PRESSURE: 139 MMHG

## 2019-07-23 VITALS — SYSTOLIC BLOOD PRESSURE: 110 MMHG | DIASTOLIC BLOOD PRESSURE: 68 MMHG

## 2019-07-23 VITALS — SYSTOLIC BLOOD PRESSURE: 152 MMHG | DIASTOLIC BLOOD PRESSURE: 83 MMHG

## 2019-07-23 LAB
ADD MANUAL DIFF: NO
BASOPHILS NFR BLD AUTO: 1.1 % (ref 0–2)
EOSINOPHIL NFR BLD AUTO: 3.4 % (ref 0–3)
ERYTHROCYTE [DISTWIDTH] IN BLOOD BY AUTOMATED COUNT: 13.7 % (ref 11.6–14.8)
HCT VFR BLD CALC: 26.7 % (ref 42–52)
HGB BLD-MCNC: 9 G/DL (ref 14.2–18)
LYMPHOCYTES NFR BLD AUTO: 12.9 % (ref 20–45)
MCV RBC AUTO: 88 FL (ref 80–99)
MONOCYTES NFR BLD AUTO: 8 % (ref 1–10)
NEUTROPHILS NFR BLD AUTO: 74.5 % (ref 45–75)
PLATELET # BLD: 487 K/UL (ref 150–450)
RBC # BLD AUTO: 3.02 M/UL (ref 4.7–6.1)
WBC # BLD AUTO: 12.4 K/UL (ref 4.8–10.8)

## 2019-07-23 RX ADMIN — LISINOPRIL SCH MG: 10 TABLET ORAL at 09:55

## 2019-07-23 RX ADMIN — DOCUSATE SODIUM SCH MG: 100 CAPSULE, LIQUID FILLED ORAL at 08:53

## 2019-07-23 RX ADMIN — ACETAMINOPHEN SCH MG: 500 TABLET, FILM COATED ORAL at 18:49

## 2019-07-23 RX ADMIN — ENOXAPARIN SODIUM SCH MG: 40 INJECTION SUBCUTANEOUS at 08:55

## 2019-07-23 RX ADMIN — ACETAMINOPHEN SCH MG: 500 TABLET, FILM COATED ORAL at 08:52

## 2019-07-23 RX ADMIN — DOCUSATE SODIUM SCH MG: 100 CAPSULE, LIQUID FILLED ORAL at 18:49

## 2019-07-23 RX ADMIN — DOCUSATE SODIUM SCH MG: 100 CAPSULE, LIQUID FILLED ORAL at 14:52

## 2019-07-23 RX ADMIN — ACETAMINOPHEN SCH MG: 500 TABLET, FILM COATED ORAL at 14:53

## 2019-07-23 NOTE — NUR
NURSE NOTES:

Done dressing change on right knee op site. No bleeding or infection signs noted. Staples 
intact on op site. Small round skin abrasion on right side of knee without infection signs. 
Apply with Xeroform, 4 of 4x4 gauze and 2 tegarderm. Denies pain and apply ice packs per 
pt's request. No swelling or warmth noted. Will continue to monitor.

## 2019-07-23 NOTE — NUR
HAND-OFF: 

Report given to Manasa, pt in stable condition, endorsed to nurse that DC paperwork was done 
by mistake for this pt. She will communicate it to morning nurse on report tomorrow.

## 2019-07-23 NOTE — NUR
pt. Walking, to the restroom and just eat breakfast.  AOx4. Hep lock on L wrist. NO signs of 
distress at this time.  Bed in lowest position and locked.  Call light within reach.    Will 
continue to follow plan of care.

## 2019-07-23 NOTE — NUR
NURSE NOTES:

Received report & pt from SONAL Bedoya. Pt lying in bed, a&ox4, in room air. No s/s of acute 
distress & no c/o pain at this time. Surgical dressing C/D/I. Pt on CPM and tolerating well. 
Will remove CPM tonight. IV site intact & S/L'd. Bed in lowest position, call light within 
reach. Will continue to monitor.

## 2019-07-23 NOTE — NUR
P.T WEEKLY PROGRESS NOTES:

PATIENT PROGRESSING SLOW HOWEVER STEADY  DURING THE COURSE OF P.T SESSIONS. PATIENT 
PRESENTED INCREASED SWELLING AND PAIN OF THE R KNEE LIMITING MOBILITY PERFORMANCE AND 
SAFETY. R KNEE FLEXION ROM IS APPROX.80 DEG. WITH GENTLE PRESSURE APPLIED AT  TOLERABLE END 
RANGE. PATIENT ABLE TO PERFORM BED MOBILITY TASKS INDEPENDENTLY WITH MOD-MIN AMT OF 
DIFFICULTY DEPENDING ON PAIN LEVEL. PATIENT ABLE TO PERFORM AND COMPLETE TRANSFER MOBILITY 
TASKS WITH SBA.  PATIENT ABLE TO AMBULATE  DISTANCE FROM 150-200 FEET DEPENDING ON PAIN  
LEVEL. PATIENT CONTINUE TO REQUIRE CUES DURING  GAIT TO IMPROVE GAIT DYNAMICS AND SAFETY TO 
FACILITATE INDEPENDENCE IN AMBULATION.

-------------------------------------------------------------------------------

Addendum: 07/23/19 at 1657 by BRITTNEY FERRO PT

-------------------------------------------------------------------------------

P.T WEEKLY PROGRESS NOTES:

PATIENT PROGRESSING SLOW HOWEVER STEADY  DURING THE COURSE OF P.T SESSIONS. PATIENT 
PRESENTED INCREASED SWELLING AND PAIN OF THE R KNEE LIMITING MOBILITY PERFORMANCE AND 
SAFETY. R KNEE FLEXION ROM IS APPROX.80 DEG. WITH GENTLE PRESSURE APPLIED AT  TOLERABLE END 
RANGE. PATIENT ABLE TO PERFORM BED MOBILITY TASKS INDEPENDENTLY WITH MOD-MIN AMT OF 
DIFFICULTY DEPENDING ON PAIN LEVEL. PATIENT ABLE TO PERFORM AND COMPLETE TRANSFER MOBILITY 
TASKS WITH SBA.  PATIENT ABLE TO AMBULATE  DISTANCE FROM 150-200 FEET DEPENDING ON PAIN  
LEVEL. PATIENT CONTINUE TO REQUIRE CUES DURING  GAIT TO IMPROVE GAIT DYNAMICS AND SAFETY TO 
FACILITATE INDEPENDENCE IN AMBULATION. WILL CONTINUE WITH POC WITH PROGRESSION OF 
ACTIVITIES.

## 2019-07-23 NOTE — NUR
CASE MANAGEMENT:REVIEW



7/23/19

SI: POD #8

RT KNEE RESURFACING ARTHROPLASTY

99..6   124  19  98/59  100% ON RA



IS: IV VENOFER QHS

PROTONIX PO QD

LOVENOX SQ QD

OXYCONTIN PO Q12

**: MED/SURG STATUS 3 EAST





PLAN:

PLAN IS FOR PATIENT TO DISCHARGE TO REHAB FACILITY

RECEIVED FAX STATING PATIENT IS AUTHORIZED FOR SNF PLACEMENT ~ REFERRED TO SNF ~ SNF LEFT 
MULTIPLE MESSAGES FOR ADJUSTOR JAYDEN REGARDING AUTHORIZATION

PATIENT WILL  TRANSFER TO LOWER LEVEL OF CARE ONCE ADJUSTOR CALLS SNF BACK

## 2019-07-23 NOTE — GENERAL PROGRESS NOTE
Assessment/Plan


Assessment/Plan:


Right knee resurfacing arthroplasty


right knee arthritis


postop pain 


post op anemia


mild renal insufficiency 





PLAN


1. incentive spirometry


2. Lovenox


3. PT evaluation and therapy


4. still no placement


5. Pain management adequate


6. discharge planned to rehab pending approval 


7. iv iron as inpatient





Subjective


Allergies:  


Coded Allergies:  


     No Known Allergies (Unverified , 7/12/19)


Subjective


care noted


stable hemodynamics





Objective





Last 24 Hour Vital Signs








  Date Time  Temp Pulse Resp B/P (MAP) Pulse Ox O2 Delivery O2 Flow Rate FiO2


 


7/23/19 04:00 98.4 100 17 118/71 (87) 98   


 


7/23/19 00:00 98.3 104 17 116/64 (81) 98   


 


7/22/19 21:00      Room Air  


 


7/22/19 20:00 98.6 98 18 114/75 (88) 98   


 


7/22/19 17:34 98.2       


 


7/22/19 16:00 98.2 88 20 102/73 (83) 95   


 


7/22/19 12:00 98.5 97 18 101/63 (76) 97   


 


7/22/19 09:07 98.3       


 


7/22/19 09:00      Room Air  

















Intake and Output  


 


 7/22/19 7/23/19





 19:00 07:00


 


Intake Total  480 ml


 


Output Total 500 ml 


 


Balance -500 ml 480 ml


 


  


 


Intake Oral  480 ml


 


Output Urine Total 500 ml 


 


# Voids  1


 


# Bowel Movements 1 








Height (Feet):  5


Height (Inches):  5.00


Weight (Pounds):  220


Objective


WDWN


NAD


clear breath sounds bilaterally without rhonchi or wheeze


[id number] without MRG


NABS nontender no HSM


no CCE


nonfocal











Suraj Jarrett MD Jul 23, 2019 08:30

## 2019-07-24 VITALS — DIASTOLIC BLOOD PRESSURE: 74 MMHG | SYSTOLIC BLOOD PRESSURE: 133 MMHG

## 2019-07-24 VITALS — SYSTOLIC BLOOD PRESSURE: 89 MMHG | DIASTOLIC BLOOD PRESSURE: 56 MMHG

## 2019-07-24 VITALS — DIASTOLIC BLOOD PRESSURE: 64 MMHG | SYSTOLIC BLOOD PRESSURE: 96 MMHG

## 2019-07-24 VITALS — SYSTOLIC BLOOD PRESSURE: 126 MMHG | DIASTOLIC BLOOD PRESSURE: 61 MMHG

## 2019-07-24 VITALS — DIASTOLIC BLOOD PRESSURE: 63 MMHG | SYSTOLIC BLOOD PRESSURE: 96 MMHG

## 2019-07-24 VITALS — SYSTOLIC BLOOD PRESSURE: 92 MMHG | DIASTOLIC BLOOD PRESSURE: 60 MMHG

## 2019-07-24 RX ADMIN — ACETAMINOPHEN SCH MG: 500 TABLET, FILM COATED ORAL at 08:30

## 2019-07-24 RX ADMIN — HYDROCODONE BITARTRATE AND ACETAMINOPHEN PRN TAB: 5; 325 TABLET ORAL at 12:55

## 2019-07-24 RX ADMIN — LISINOPRIL SCH MG: 10 TABLET ORAL at 08:35

## 2019-07-24 RX ADMIN — DOCUSATE SODIUM SCH MG: 100 CAPSULE, LIQUID FILLED ORAL at 12:55

## 2019-07-24 RX ADMIN — HYDROCODONE BITARTRATE AND ACETAMINOPHEN PRN TAB: 5; 325 TABLET ORAL at 23:59

## 2019-07-24 RX ADMIN — ENOXAPARIN SODIUM SCH MG: 40 INJECTION SUBCUTANEOUS at 08:32

## 2019-07-24 RX ADMIN — HYDROCODONE BITARTRATE AND ACETAMINOPHEN PRN TAB: 5; 325 TABLET ORAL at 17:36

## 2019-07-24 RX ADMIN — DOCUSATE SODIUM SCH MG: 100 CAPSULE, LIQUID FILLED ORAL at 08:30

## 2019-07-24 RX ADMIN — ACETAMINOPHEN SCH MG: 500 TABLET, FILM COATED ORAL at 12:56

## 2019-07-24 RX ADMIN — ACETAMINOPHEN SCH MG: 500 TABLET, FILM COATED ORAL at 17:36

## 2019-07-24 RX ADMIN — DOCUSATE SODIUM SCH MG: 100 CAPSULE, LIQUID FILLED ORAL at 17:35

## 2019-07-24 NOTE — NUR
CASE MANAGEMENT:REVIEW



7/24/19

SI: POD #9

RT KNEE RESURFACING ARTHROPLASTY

99.0   115  18  89/56  100% on ra



IS: IV VENOFER QHS

PROTONIX PO QD

LOVENOX SQ QD

TYLENOL PO TID

LISINOPRIL PO QD ~ HELD

**: MED/SURG STATUS 3 EAST





PLAN:

PATIENT HAS BEEN ACCEPTED TO [facility] IS WAITING TO RECEIVE AUTHORIZATION FROM  ~ JAYDEN

## 2019-07-24 NOTE — NUR
NURSE NOTES:

Received report from Manasa RN. patient is awake alert and oriented, no acute distress noted, 
reporting pain 5/10 in left knee, requesting pain medication, will administer scheduled 
Tylenol as ordered. Right knee dressing clean, dry, intact. IV intact. Patient updated on 
plan of care for the day. Side rails upx2, bed low and locked, call light in reach. Will 
continue to monitor.

## 2019-07-24 NOTE — NUR
****DISCHARGE PLANNING

RFA WAS COMPLETED AND FAXED TO [employer] ON FRIDAY

WRITTEN AUTHORIZATION  FOR SNF WAS FAXED TO [facility] AT 7PM ON FRIDAY

PATIENT WAS REFERRED TO [facility] ON MONDAY ALONG WITH THE WRITTEN AUTH

LILLY HERNANDEZ CONTACTED JAYDEN KRUSE, WHO SAID HE HAD TO OBTAIN ADDITIONAL FINANCE 
AUTHORIZATION FROM ANOTHER DEPARTMENT.



WE ARE CURRENTLY WAITING FOR LILLY HERNANDEZ TO RECEIVE FINANCIAL AUTHORIZATION FROM 
[facility]

ONCE AUTHORIZATION HAS BEEN RECEIVED WE WILL TRANSFER TO [facility]

## 2019-07-24 NOTE — GENERAL SURGERY PROGRESS NOTE
General Surgery-Progress Note


Subjective


Procedure Performed


Right Total Knee Resurface Right


Symptoms:  improved





Objective





Last 24 Hour Vital Signs








  Date Time  Temp Pulse Resp B/P (MAP) Pulse Ox O2 Delivery O2 Flow Rate FiO2


 


7/24/19 09:00      Room Air  


 


7/24/19 08:35    96/64    


 


7/24/19 08:17  115  96/64 (75)    


 


7/24/19 08:00 99.0 119 18 89/56 (67) 100   


 


7/24/19 00:00 98.0 109 18 96/63 (74) 99   


 


7/23/19 21:00      Room Air  


 


7/23/19 20:00 99.1 105 18 98/64 (75) 95   


 


7/23/19 16:00 98.6 92 18 110/68 (82) 99   


 


7/23/19 12:00 98.1 92 20 139/57 (84) 98   








I&O











Intake and Output  


 


 7/23/19 7/24/19





 19:00 07:00


 


Intake Total 650 ml 480 ml


 


Balance 650 ml 480 ml


 


  


 


Intake Oral 650 ml 480 ml


 


# Voids 3 2


 


# Bowel Movements 1 








Dressing:  dry


Wound:  clean


Additional Comments


Patient is stable and may transfer to Rehab when carrier releases 

authorization. 


12 day rehab was authorized then put on hold by carrier.


Dr. Jarrett following.





Assessment


Post-op Diagnosis


Patient is NOT able to discharge to home. He has No Stable housing to recover 

in. Needs Rehab and possible temporary housing. Request zacarias to insurance 

carrier 7-16-19











Phil Ashton Jul 24, 2019 11:00

## 2019-07-24 NOTE — NUR
*-* INSURANCE *-*



UPDATED CLINICALS HAVE BEEN FAXED 



CALLED : RAMONA BROWER

P:[phone]

P:[phone]

F: [phone]

## 2019-07-24 NOTE — NUR
NURSE NOTES:

Patient awake in bed, alert and oriented x4, not in severe pain at this time. Instructed the 
use of call light. Call light and needs in reach. Bed in lowest position and lock engaged. 
Will continue to monitor.

## 2019-07-24 NOTE — NUR
NURSE NOTES:

Received call from Dr. Jarrett, per report from MD patient has been calling office to report 
he is not getting pain medication. Patient has not requested any pain medication aside from 
scheduled Tylenol today. Dr. Jarrett gave order for KATLYN Nelson, order entered. Will continue 
to monitor.

## 2019-07-24 NOTE — GENERAL PROGRESS NOTE
Assessment/Plan


Assessment/Plan:


Right knee resurfacing arthroplasty


right knee arthritis


postop pain 


post op anemia


mild renal insufficiency 





PLAN


1. incentive spirometry


2. Lovenox


3. PT evaluation and therapy


4. still no placement


5. Pain management adequate


6. discharge planned to rehab pending approval 


7. iv iron as inpatient





Subjective


Allergies:  


Coded Allergies:  


     No Known Allergies (Unverified , 7/12/19)


Subjective


care noted


stable hemodynamics





Objective





Last 24 Hour Vital Signs








  Date Time  Temp Pulse Resp B/P (MAP) Pulse Ox O2 Delivery O2 Flow Rate FiO2


 


7/24/19 12:00 98.6 102 19 92/60 (71) 100   


 


7/24/19 09:00      Room Air  


 


7/24/19 08:35    96/64    


 


7/24/19 08:17  115  96/64 (75)    


 


7/24/19 08:00 99.0 119 18 89/56 (67) 100   


 


7/24/19 00:00 98.0 109 18 96/63 (74) 99   


 


7/23/19 21:00      Room Air  


 


7/23/19 20:00 99.1 105 18 98/64 (75) 95   

















Intake and Output  


 


 7/23/19 7/24/19





 19:00 07:00


 


Intake Total 650 ml 480 ml


 


Balance 650 ml 480 ml


 


  


 


Intake Oral 650 ml 480 ml


 


# Voids 3 2


 


# Bowel Movements 1 








Height (Feet):  5


Height (Inches):  5.00


Weight (Pounds):  213


Objective


WDWN


NAD


clear breath sounds bilaterally without rhonchi or wheeze


[id number] without MRG


NABS nontender no HSM


no CCE


nonfocal











Suraj Jarrett MD Jul 24, 2019 16:12

## 2019-07-25 VITALS — DIASTOLIC BLOOD PRESSURE: 64 MMHG | SYSTOLIC BLOOD PRESSURE: 107 MMHG

## 2019-07-25 VITALS — DIASTOLIC BLOOD PRESSURE: 73 MMHG | SYSTOLIC BLOOD PRESSURE: 112 MMHG

## 2019-07-25 VITALS — SYSTOLIC BLOOD PRESSURE: 112 MMHG | DIASTOLIC BLOOD PRESSURE: 53 MMHG

## 2019-07-25 VITALS — SYSTOLIC BLOOD PRESSURE: 116 MMHG | DIASTOLIC BLOOD PRESSURE: 74 MMHG

## 2019-07-25 VITALS — SYSTOLIC BLOOD PRESSURE: 110 MMHG | DIASTOLIC BLOOD PRESSURE: 73 MMHG

## 2019-07-25 RX ADMIN — ACETAMINOPHEN SCH MG: 500 TABLET, FILM COATED ORAL at 17:18

## 2019-07-25 RX ADMIN — DOCUSATE SODIUM SCH MG: 100 CAPSULE, LIQUID FILLED ORAL at 17:17

## 2019-07-25 RX ADMIN — ENOXAPARIN SODIUM SCH MG: 40 INJECTION SUBCUTANEOUS at 09:37

## 2019-07-25 RX ADMIN — HYDROCODONE BITARTRATE AND ACETAMINOPHEN PRN TAB: 5; 325 TABLET ORAL at 04:08

## 2019-07-25 RX ADMIN — DOCUSATE SODIUM SCH MG: 100 CAPSULE, LIQUID FILLED ORAL at 09:32

## 2019-07-25 RX ADMIN — ACETAMINOPHEN SCH MG: 500 TABLET, FILM COATED ORAL at 13:00

## 2019-07-25 RX ADMIN — LISINOPRIL SCH MG: 10 TABLET ORAL at 09:32

## 2019-07-25 RX ADMIN — DOCUSATE SODIUM SCH MG: 100 CAPSULE, LIQUID FILLED ORAL at 14:30

## 2019-07-25 RX ADMIN — HYDROCODONE BITARTRATE AND ACETAMINOPHEN PRN TAB: 5; 325 TABLET ORAL at 10:32

## 2019-07-25 RX ADMIN — ACETAMINOPHEN SCH MG: 500 TABLET, FILM COATED ORAL at 09:33

## 2019-07-25 RX ADMIN — HYDROCODONE BITARTRATE AND ACETAMINOPHEN PRN TAB: 5; 325 TABLET ORAL at 14:28

## 2019-07-25 NOTE — NUR
CHARGE NURSE NOTES:

PER  MASON, PATIENT IS AUTHORIZED TO GO TO [facility] AND HAS BED ASSIGNMENT, 
 TO ARRANGE TRANSPORT FOR APPROXIMATELY 1700. SPOKE WITH PATIENT AND PATIENT IS 
AGREEABLE TO PLAN OF CARE AND AGREES TO GO VIA AMBULANCE AS ORDERED BY NICO VERDUZCO. ENTERED 
DISCHARGE ORDER. UPDATED PRIMARY NURSE KIMBERLI.

## 2019-07-25 NOTE — NUR
CASE MANAGEMENT:REVIEW



7/25/19

SI: POD #10

RT KNEE RESURFACING ARTHROPLASTY

98.1   67  19  110/73  96% ON RA



IS: NORCO PO Q4HRS PRN

LISINOPRIL PO QD

PROTONIX PO QD

LOVENOX SQ QD

TYLENOL PO TID

**: MED/SURG STATUS 3 EAST





PLAN:

PATIENT HAS BEEN ACCEPTED TO [facility] IS WAITING TO RECEIVE AUTHORIZATION FROM [facility] [employer]

## 2019-07-25 NOTE — NUR
*-* INSURANCE *-*



UPDATED CLINICALS HAVE BEEN FAXED 



CALLED : RAMONA BROWER

P:[phone]

P:[phone]

F: [phone]

## 2019-07-25 NOTE — NUR
CHARGE NURSE NOTES:

PATIENT IS INSISTING ON LEAVING HOSPITAL RIGHT AWAY, PATIENT STATED HE WANTS TO DRIVE 
HIMSELF, EDUCATED PATIENT HE CANNOT DRIVE HIMSELF BECAUSE HE HAD KNEE SURGERY AND THE DOCTOR 
HAS NOT CLEARED HIM TO DRIVE YET, ALSO INFORMED PATIENT THAT IF HE GOES TO [facility] HE 
CANNOT DRIVE HIMSELF THERE, HE MUST BE TRANSPORTED VIA BLS TRANSPORT. PER  
MASON, AUTHORIZATION FOR [facility] HAS NOT GONE THROUGH YET. PATIENT PROVIDED ADDRESS AND 
STATED '"I CAN GO HERE", EDUCATED PATIENT THAT THERE IS NOT YET A DISCHARGE ORDER. CALLED 
TIA [facility] AND INFORMED PA OF CURRENT SITUATION, INFORMED PA THAT PATIENT PROVIDED 
AN ADDRESS, TIA VERDUZCO STATED THAT PATIENT CANNOT BE DISCHARGED HOME, HE MUST GO FOR 
ACUTE REHAB. NICO VERDUZCO STATED THAT IF PATIENT WANTS TO LEAVE RIGHT NOW HE MUST GO AMA. 
INFORMED PATIENT OF THIS AND PATIENT BECAME IRRITABLE AND STATED HE WILL CALL DOCTORS OFFICE 
HIMSELF. PRIMARY NURSE KIMBERLI UPDATED AND AWARE OF SITUATION.

## 2019-07-25 NOTE — NUR
CHARGE NURSE NOTES:

RECEIVED CALL FROM NICO VERDUZCO. PER NICO VERDUZCO PAT HERNANDEZ HAS AUTHORIZED PATIENT TO GO TO 
FACILITY. PA STATED THAT IF PATIENT AGREES TO BE TRANSPORTED VIA BLS THEN PATIENT MAY BE 
DISCHARGED, IF PATIENT REFUSES TRANSPORT AND WANTS TO DRIVE HE MUST SIGN AMA. CALLED 
MASON  FOR STATUS UPDATE ON BED AT FACILITY, NO ANSWER FROM , 
WILL ATTEMPT AGAIN. PRIMARY NURSE UPDATED.

## 2019-07-25 NOTE — NUR
***DISCHARGE PLANNING

 CALLED AND LEFT [facility] FOR COVERING JAYDEN KRUSE T: [phone]

REQUESTING STATUS OF AUTHORIZATION FOR SNF PLACEMENT

AWAIT RETURN CALL

## 2019-07-25 NOTE — NUR
NURSE NOTES:

Received report & pt from SONAL Bernal for continuity of care. Pt lying in bed, a&ox4, in 
room air. No s/s of acute distress & no c/o pain at this time. Surgical dressing C/D/I. IV 
site intact & S/L'd. Bed in lowest position, call light within reach. Will continue to 
monitor.

## 2019-07-25 NOTE — NUR
NURSE NOTES:

patient discharged with all his belonging as ordered.report given to ambulance personal. i 
called next of kin as noted in face sheet .However, she did not .report given to 
Shahida PRUITT @ [facility] as noted earlier.

## 2019-07-25 NOTE — GENERAL PROGRESS NOTE
Assessment/Plan


Assessment/Plan:


Right knee resurfacing arthroplasty


right knee arthritis


postop pain 


post op anemia


mild renal insufficiency 





PLAN


1. incentive spirometry


2. Lovenox


3. PT evaluation and therapy


4. still no placement


5. Pain management adequate


6. discharge planned to rehab pending approval 


7. iv iron discontinued





Subjective


Allergies:  


Coded Allergies:  


     No Known Allergies (Unverified , 7/12/19)


Subjective


care noted


stable hemodynamics





Objective





Last 24 Hour Vital Signs








  Date Time  Temp Pulse Resp B/P (MAP) Pulse Ox O2 Delivery O2 Flow Rate FiO2


 


7/25/19 08:00 98.1 67 19 110/73 (85) 96   


 


7/25/19 04:45 98.3 90 16 112/53 (72) 96   


 


7/25/19 00:00 99.0 95 20 107/64 (78) 99   


 


7/24/19 21:00      Room Air  


 


7/24/19 20:00 97.4 95 20 133/74 (93) 99   


 


7/24/19 16:00 98.1 116 18 126/61 (82) 100   


 


7/24/19 12:00 98.6 102 19 92/60 (71) 100   

















Intake and Output  


 


 7/24/19 7/25/19





 19:00 07:00


 


Intake Total  2220 ml


 


Output Total  1200 ml


 


Balance  1020 ml


 


  


 


Intake Oral  2220 ml


 


Output Urine Total  1200 ml


 


# Voids  3


 


# Bowel Movements  1








Height (Feet):  5


Height (Inches):  5.00


Weight (Pounds):  213


Objective


WDWN


NAD


clear breath sounds bilaterally without rhonchi or wheeze


[id number] without MRG


NABS nontender no HSM


no CCE


nonfocal











Suraj Jarrett MD Jul 25, 2019 09:12

## 2019-07-26 NOTE — NUR
*-* INSURANCE *-*



UPDATED CLINICALS HAVE BEEN FAXED 



CALLED : RAMONA BROWER

P:[phone]

P:[phone]

F: [phone]

## 2019-07-29 NOTE — DISCHARGE SUMMARY
Discharge Summary


Hospital Course


Date of Admission


Jul 15, 2019 at 06:33


Date of Discharge


Jul 25, 2019 at 18:32


Admitting Diagnosis


Right knee arthritis 





Reason for Hospitalization:  elective surgery


GARY Rodriguez is a 53 year old male who was admitted on Jul 15, 2019 at 06:33 

for   Right Knee traumatic arthritis.  


Patient was admitted for elective surgery.


Consultations


Dr Jarrett -[facility]


Procedures


s/p 7/15/19 by Dr Choi


Right knee resurfacing arthroplasty


Hospital Course


status post surgery 


initially IV fluids


s/p perioperative antibiotics


neurovascular status  closely monitored,  stable 


incision clean , dry, and intact 


pain management addressed , and pain was eventually controlled 


hemodynamically stable 


ambulated with PT  


fall precautions maintained


DVT prophylaxis provided 


use of incentive spirometry was  encouraged while in the bed 


tolerated diet , IV fluids discontinued 


GI prophylaxis provided 


antiemetics were on board as needed 


blood pressure was managed with Lisinopril and remained stable 


voided freely


patient developed postoperative anemia 


patient was on IV Venofer


hemoglobin and hematocrit  were closely monitored with   goal to keep 

hemoglobin above 7 


prior to discharge hemoglobin 9, hematocrit 26.7


bowel regimen instituted 


transfer was arranged to skilled nursing facility for continuation of care


patient was stable for discharge 


discharge instructions provided 


follow up with surgeon as outpatient as advised by surgeon





FINAL DIAGNOSES


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 

weightbearing deformity and medial compartment joint space narrowing.


s/p Right Total Knee Resurfacing


Postoperative pain


Postoperative anemia


Hypertension





Discharge Medications


Continued Medications:  


Hydrocodone Bit/Acetaminophen 5-325* (Norco 5-325*) 1 Each Tablet


1 TAB ORAL Q4H PRN for For Pain, #10 TAB 0 Refills (This prescription has been 

renewed)





Lisinopril* (Lisinopril*) 10 Mg Tablet


10 MG ORAL DAILY, TAB (This prescription has been renewed)





Omeprazole (Omeprazole) 40 Mg Capsule.


40 MG ORAL DAILY, CAP (This prescription has been renewed)











Discharge


Condition Upon Discharge:  stable


Discharge Disposition


Patient was discharged to the skilled nursing facility for continuation of care 

/[facility].





Discharge Instructions


Discharge Instructions


Special Instructions


I have been assigned to complete a D/C Summary on this account. I was not 

involved in the patient management











Heather Posada NP Jul 29, 2019 08:57

## 2025-05-24 NOTE — NUR
NURSE NOTES:

Pt is asleep but easily awake. Right knee op site dressing is clear. After routine pain 
medication, pain relieved. Call light within reach. Will continue to monitor. 86-year-old male with past medical history of paroxysmal A-fib, diabetes, CKD, hyperlipidemia, bradycardia status post cardiac pacemaker who was brought to the ED from [facility] due to confusion and altered mental status. He was also found to have ESBL/ Klebsiella UTI as per urine culture. Found with troponin 1228.5>1219, creatinine 2.4 (2.0 two weeks ago). CTH negative for acute findings. Cardiology consulted in ED. Admit to medicine